# Patient Record
Sex: MALE | Employment: UNEMPLOYED | URBAN - METROPOLITAN AREA
[De-identification: names, ages, dates, MRNs, and addresses within clinical notes are randomized per-mention and may not be internally consistent; named-entity substitution may affect disease eponyms.]

---

## 2017-01-12 ENCOUNTER — DOCUMENTATION ONLY (OUTPATIENT)
Dept: NEUROLOGY | Age: 22
End: 2017-01-12

## 2017-01-13 ENCOUNTER — DOCUMENTATION ONLY (OUTPATIENT)
Dept: NEUROLOGY | Age: 22
End: 2017-01-13

## 2017-01-13 NOTE — PROGRESS NOTES
[facility] Drug Utilization Review Form commented on by Dr. Halie Morrow and faxed back.  (See Scan)

## 2017-02-06 ENCOUNTER — DOCUMENTATION ONLY (OUTPATIENT)
Dept: NEUROLOGY | Age: 22
End: 2017-02-06

## 2017-02-06 NOTE — PROGRESS NOTES
[facility] AT [facility] notes received and placed on Dr. Amber peng for review.  (See Notes)

## 2017-07-03 ENCOUNTER — HOSPITAL ENCOUNTER (INPATIENT)
Age: 22
LOS: 4 days | Discharge: HOME | DRG: 053 | End: 2017-07-07
Attending: EMERGENCY MEDICINE | Admitting: HOSPITALIST
Payer: MEDICAID

## 2017-07-03 ENCOUNTER — HOSPITAL ENCOUNTER (EMERGENCY)
Age: 22
Discharge: HOME | End: 2017-07-03
Attending: EMERGENCY MEDICINE | Admitting: EMERGENCY MEDICINE
Payer: MEDICAID

## 2017-07-03 ENCOUNTER — APPOINTMENT (OUTPATIENT)
Dept: CT IMAGING | Age: 22
DRG: 053 | End: 2017-07-03
Attending: PSYCHIATRY & NEUROLOGY
Payer: MEDICAID

## 2017-07-03 VITALS
WEIGHT: 174.6 LBS | SYSTOLIC BLOOD PRESSURE: 115 MMHG | OXYGEN SATURATION: 99 % | BODY MASS INDEX: 24.35 KG/M2 | HEART RATE: 90 BPM | RESPIRATION RATE: 19 BRPM | DIASTOLIC BLOOD PRESSURE: 68 MMHG | TEMPERATURE: 98 F

## 2017-07-03 DIAGNOSIS — G40.909 SEIZURE DISORDER (HCC): Primary | ICD-10-CM

## 2017-07-03 DIAGNOSIS — R56.9 SEIZURES (HCC): Primary | ICD-10-CM

## 2017-07-03 LAB
ANION GAP BLD CALC-SCNC: 12 MMOL/L (ref 3–18)
ANION GAP BLD CALC-SCNC: 9 MMOL/L (ref 3–18)
BASOPHILS # BLD AUTO: 0 K/UL (ref 0–0.06)
BASOPHILS # BLD AUTO: 0 K/UL (ref 0–0.06)
BASOPHILS # BLD: 0 % (ref 0–2)
BASOPHILS # BLD: 0 % (ref 0–2)
BUN SERPL-MCNC: 14 MG/DL (ref 7–18)
BUN SERPL-MCNC: 19 MG/DL (ref 7–18)
BUN/CREAT SERPL: 11 (ref 12–20)
BUN/CREAT SERPL: 13 (ref 12–20)
CALCIUM SERPL-MCNC: 8.5 MG/DL (ref 8.5–10.1)
CALCIUM SERPL-MCNC: 9 MG/DL (ref 8.5–10.1)
CARBAMAZEPINE SERPL-MCNC: 2 UG/ML (ref 4–12)
CHLORIDE SERPL-SCNC: 104 MMOL/L (ref 100–108)
CHLORIDE SERPL-SCNC: 108 MMOL/L (ref 100–108)
CO2 SERPL-SCNC: 23 MMOL/L (ref 21–32)
CO2 SERPL-SCNC: 23 MMOL/L (ref 21–32)
CREAT SERPL-MCNC: 1.08 MG/DL (ref 0.6–1.3)
CREAT SERPL-MCNC: 1.71 MG/DL (ref 0.6–1.3)
DIFFERENTIAL METHOD BLD: ABNORMAL
DIFFERENTIAL METHOD BLD: NORMAL
EOSINOPHIL # BLD: 0 K/UL (ref 0–0.4)
EOSINOPHIL # BLD: 0.2 K/UL (ref 0–0.4)
EOSINOPHIL NFR BLD: 0 % (ref 0–5)
EOSINOPHIL NFR BLD: 2 % (ref 0–5)
ERYTHROCYTE [DISTWIDTH] IN BLOOD BY AUTOMATED COUNT: 13.8 % (ref 11.6–14.5)
ERYTHROCYTE [DISTWIDTH] IN BLOOD BY AUTOMATED COUNT: 14.2 % (ref 11.6–14.5)
GLUCOSE SERPL-MCNC: 115 MG/DL (ref 74–99)
GLUCOSE SERPL-MCNC: 142 MG/DL (ref 74–99)
HCT VFR BLD AUTO: 38.4 % (ref 36–48)
HCT VFR BLD AUTO: 43.2 % (ref 36–48)
HGB BLD-MCNC: 13.1 G/DL (ref 13–16)
HGB BLD-MCNC: 14.7 G/DL (ref 13–16)
LYMPHOCYTES # BLD AUTO: 36 % (ref 21–52)
LYMPHOCYTES # BLD AUTO: 6 % (ref 21–52)
LYMPHOCYTES # BLD: 0.7 K/UL (ref 0.9–3.6)
LYMPHOCYTES # BLD: 3.3 K/UL (ref 0.9–3.6)
MAGNESIUM SERPL-MCNC: 2.8 MG/DL (ref 1.6–2.6)
MCH RBC QN AUTO: 28.2 PG (ref 24–34)
MCH RBC QN AUTO: 28.4 PG (ref 24–34)
MCHC RBC AUTO-ENTMCNC: 34 G/DL (ref 31–37)
MCHC RBC AUTO-ENTMCNC: 34.1 G/DL (ref 31–37)
MCV RBC AUTO: 82.8 FL (ref 74–97)
MCV RBC AUTO: 83.4 FL (ref 74–97)
MONOCYTES # BLD: 0.7 K/UL (ref 0.05–1.2)
MONOCYTES # BLD: 1.6 K/UL (ref 0.05–1.2)
MONOCYTES NFR BLD AUTO: 13 % (ref 3–10)
MONOCYTES NFR BLD AUTO: 8 % (ref 3–10)
NEUTS SEG # BLD: 10.3 K/UL (ref 1.8–8)
NEUTS SEG # BLD: 5 K/UL (ref 1.8–8)
NEUTS SEG NFR BLD AUTO: 54 % (ref 40–73)
NEUTS SEG NFR BLD AUTO: 81 % (ref 40–73)
PLATELET # BLD AUTO: 206 K/UL (ref 135–420)
PLATELET # BLD AUTO: 224 K/UL (ref 135–420)
PMV BLD AUTO: 10 FL (ref 9.2–11.8)
PMV BLD AUTO: 10.2 FL (ref 9.2–11.8)
POTASSIUM SERPL-SCNC: 3.5 MMOL/L (ref 3.5–5.5)
POTASSIUM SERPL-SCNC: 3.9 MMOL/L (ref 3.5–5.5)
RBC # BLD AUTO: 4.64 M/UL (ref 4.7–5.5)
RBC # BLD AUTO: 5.18 M/UL (ref 4.7–5.5)
SODIUM SERPL-SCNC: 139 MMOL/L (ref 136–145)
SODIUM SERPL-SCNC: 140 MMOL/L (ref 136–145)
WBC # BLD AUTO: 12.7 K/UL (ref 4.6–13.2)
WBC # BLD AUTO: 9.2 K/UL (ref 4.6–13.2)

## 2017-07-03 PROCEDURE — 96375 TX/PRO/DX INJ NEW DRUG ADDON: CPT

## 2017-07-03 PROCEDURE — 74011250637 HC RX REV CODE- 250/637: Performed by: PSYCHIATRY & NEUROLOGY

## 2017-07-03 PROCEDURE — 85025 COMPLETE CBC W/AUTO DIFF WBC: CPT | Performed by: EMERGENCY MEDICINE

## 2017-07-03 PROCEDURE — 99285 EMERGENCY DEPT VISIT HI MDM: CPT

## 2017-07-03 PROCEDURE — 74011250637 HC RX REV CODE- 250/637: Performed by: EMERGENCY MEDICINE

## 2017-07-03 PROCEDURE — 74011250636 HC RX REV CODE- 250/636: Performed by: NURSE PRACTITIONER

## 2017-07-03 PROCEDURE — 74011250637 HC RX REV CODE- 250/637: Performed by: HOSPITALIST

## 2017-07-03 PROCEDURE — 96361 HYDRATE IV INFUSION ADD-ON: CPT

## 2017-07-03 PROCEDURE — 80156 ASSAY CARBAMAZEPINE TOTAL: CPT | Performed by: EMERGENCY MEDICINE

## 2017-07-03 PROCEDURE — 80048 BASIC METABOLIC PNL TOTAL CA: CPT | Performed by: EMERGENCY MEDICINE

## 2017-07-03 PROCEDURE — 74011250636 HC RX REV CODE- 250/636

## 2017-07-03 PROCEDURE — 74011250636 HC RX REV CODE- 250/636: Performed by: HOSPITALIST

## 2017-07-03 PROCEDURE — 96374 THER/PROPH/DIAG INJ IV PUSH: CPT

## 2017-07-03 PROCEDURE — 70450 CT HEAD/BRAIN W/O DYE: CPT

## 2017-07-03 PROCEDURE — 65660000001 HC RM ICU INTERMED STEPDOWN

## 2017-07-03 PROCEDURE — 80048 BASIC METABOLIC PNL TOTAL CA: CPT | Performed by: NURSE PRACTITIONER

## 2017-07-03 PROCEDURE — 96365 THER/PROPH/DIAG IV INF INIT: CPT

## 2017-07-03 PROCEDURE — 80177 DRUG SCRN QUAN LEVETIRACETAM: CPT | Performed by: EMERGENCY MEDICINE

## 2017-07-03 PROCEDURE — 77030011256 HC DRSG MEPILEX <16IN NO BORD MOLN -A

## 2017-07-03 PROCEDURE — 83735 ASSAY OF MAGNESIUM: CPT | Performed by: EMERGENCY MEDICINE

## 2017-07-03 RX ORDER — ENOXAPARIN SODIUM 100 MG/ML
40 INJECTION SUBCUTANEOUS EVERY 24 HOURS
Status: DISCONTINUED | OUTPATIENT
Start: 2017-07-03 | End: 2017-07-07 | Stop reason: HOSPADM

## 2017-07-03 RX ORDER — CARBAMAZEPINE 100 MG/1
400 TABLET, CHEWABLE ORAL
Status: DISCONTINUED | OUTPATIENT
Start: 2017-07-03 | End: 2017-07-03 | Stop reason: CLARIF

## 2017-07-03 RX ORDER — CARBAMAZEPINE 200 MG/1
400 TABLET, EXTENDED RELEASE ORAL 2 TIMES DAILY
Status: DISCONTINUED | OUTPATIENT
Start: 2017-07-03 | End: 2017-07-07 | Stop reason: HOSPADM

## 2017-07-03 RX ORDER — CARBAMAZEPINE 200 MG/1
400 TABLET ORAL
Status: COMPLETED | OUTPATIENT
Start: 2017-07-03 | End: 2017-07-03

## 2017-07-03 RX ORDER — LAMOTRIGINE 100 MG/1
300 TABLET ORAL 2 TIMES DAILY
Status: DISCONTINUED | OUTPATIENT
Start: 2017-07-03 | End: 2017-07-07 | Stop reason: HOSPADM

## 2017-07-03 RX ORDER — LORAZEPAM 2 MG/ML
1 INJECTION INTRAMUSCULAR
Status: COMPLETED | OUTPATIENT
Start: 2017-07-03 | End: 2017-07-03

## 2017-07-03 RX ORDER — LEVETIRACETAM 10 MG/ML
1000 INJECTION INTRAVASCULAR EVERY 12 HOURS
Status: DISCONTINUED | OUTPATIENT
Start: 2017-07-03 | End: 2017-07-04

## 2017-07-03 RX ORDER — CARBAMAZEPINE 200 MG/1
400 TABLET, EXTENDED RELEASE ORAL EVERY 12 HOURS
Status: DISCONTINUED | OUTPATIENT
Start: 2017-07-03 | End: 2017-07-03 | Stop reason: SDUPTHER

## 2017-07-03 RX ORDER — SODIUM CHLORIDE 9 MG/ML
150 INJECTION, SOLUTION INTRAVENOUS CONTINUOUS
Status: DISCONTINUED | OUTPATIENT
Start: 2017-07-03 | End: 2017-07-07 | Stop reason: HOSPADM

## 2017-07-03 RX ORDER — LEVETIRACETAM 500 MG/1
500 TABLET ORAL 2 TIMES DAILY
Status: DISCONTINUED | OUTPATIENT
Start: 2017-07-03 | End: 2017-07-03 | Stop reason: CLARIF

## 2017-07-03 RX ORDER — LEVETIRACETAM 500 MG/5ML
INJECTION, SOLUTION, CONCENTRATE INTRAVENOUS
Status: COMPLETED
Start: 2017-07-03 | End: 2017-07-03

## 2017-07-03 RX ORDER — LAMOTRIGINE 100 MG/1
100 TABLET ORAL 2 TIMES DAILY
Status: DISCONTINUED | OUTPATIENT
Start: 2017-07-03 | End: 2017-07-03 | Stop reason: SDUPTHER

## 2017-07-03 RX ORDER — LORAZEPAM 2 MG/ML
INJECTION INTRAMUSCULAR
Status: COMPLETED
Start: 2017-07-03 | End: 2017-07-03

## 2017-07-03 RX ORDER — LORAZEPAM 2 MG/ML
2 INJECTION INTRAMUSCULAR
Status: COMPLETED | OUTPATIENT
Start: 2017-07-03 | End: 2017-07-03

## 2017-07-03 RX ADMIN — CARBAMAZEPINE 400 MG: 200 TABLET ORAL at 07:43

## 2017-07-03 RX ADMIN — LORAZEPAM 1 MG: 2 INJECTION INTRAMUSCULAR at 05:16

## 2017-07-03 RX ADMIN — LEVETIRACETAM 1 MG: 100 INJECTION, SOLUTION INTRAVENOUS at 17:57

## 2017-07-03 RX ADMIN — LORAZEPAM 1 MG: 2 INJECTION INTRAMUSCULAR at 17:03

## 2017-07-03 RX ADMIN — SODIUM CHLORIDE 125 ML/HR: 900 INJECTION, SOLUTION INTRAVENOUS at 14:34

## 2017-07-03 RX ADMIN — SODIUM CHLORIDE 125 ML/HR: 900 INJECTION, SOLUTION INTRAVENOUS at 19:49

## 2017-07-03 RX ADMIN — ENOXAPARIN SODIUM 40 MG: 40 INJECTION SUBCUTANEOUS at 21:18

## 2017-07-03 RX ADMIN — CARBAMAZEPINE 400 MG: 200 TABLET, EXTENDED RELEASE ORAL at 21:17

## 2017-07-03 RX ADMIN — LAMOTRIGINE 300 MG: 100 TABLET ORAL at 19:31

## 2017-07-03 RX ADMIN — LEVETIRACETAM 1000 MG: 10 INJECTION INTRAVENOUS at 17:48

## 2017-07-03 NOTE — CONSULTS
Neurology Consult Note      Patient: Lamont Rios MRN: [id number]  SSN: [id number]    YOB: 1995  Age: 24 y.o. Sex: male      Subjective:      Lamont Rios is a 24 y.o. male who is being seen for seizures. History provided by mother. History of seizure disorder since age 15. Initially had some generalized convulsions then after been treated only staring spells with slight head turn to side. Was seen by Dr. Enma Paredes neurology last year then switched to Dr. Khuhsboo Darling. Mother showed me his current medication bottles : Lamictal 300 mg bid, keppra 500 mg bid, Tegretol  mg bid. Mother said was away for 10 days so was not sure about his complaint in her absence. His carbamazepine level was 2 today. Apparently today had a total of 3 generalized seizures. Received tegretol 400 mg in am and then ativan after his last seizure. Been drowsy since then. 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 No pertinent surgical history.    Family History   Problem Relation Age of Onset    No Known Problems Mother     No Known Problems Sister     No Known Problems Brother     Cancer Maternal Uncle     Seizures Maternal Uncle     Seizures Paternal Uncle     Cancer Maternal Grandmother     Diabetes Maternal Grandfather     High Cholesterol Maternal Grandfather     Seizures Other      Paternal Cousin, male    No Known Problems Sister     Cancer Other      Social History   Substance Use Topics    Smoking status: Never Smoker    Smokeless tobacco: Never Used    Alcohol use No      Current Facility-Administered Medications   Medication Dose Route Frequency Provider Last Rate Last Dose    0.9% sodium chloride infusion  125 mL/hr IntraVENous CONTINUOUS Bettie Chang  mL/hr at 07/03/17 1434 125 mL/hr at 07/03/17 1434    levETIRAcetam (KEPPRA) 1,000 mg in 100 ml IVPB  1,000 mg IntraVENous Q12H Jared Melton  mL/hr at 07/03/17 1748 1,000 mg at 07/03/17 1748     Current Outpatient Prescriptions   Medication Sig Dispense Refill    carBAMazepine XR (TEGRETOL XR) 400 mg SR tablet Take 1 Tab by mouth two (2) times a day. 60 Tab 3    levETIRAcetam (KEPPRA) 500 mg tablet Take 1 Tab by mouth two (2) times a day. 60 Tab 3    lamoTRIgine (LAMICTAL) 100 mg tablet Take 1 Tab by mouth two (2) times a day. 60 Tab 3    lamoTRIgine (LAMICTAL) 50 mg disintegrating tablet Take 1 Tab by mouth two (2) times a day. 60 Tab 1        No Known Allergies    Labs:    Recent Results (from the past 24 hour(s))   METABOLIC PANEL, BASIC    Collection Time: 07/03/17  4:50 AM   Result Value Ref Range    Sodium 139 136 - 145 mmol/L    Potassium 3.5 3.5 - 5.5 mmol/L    Chloride 104 100 - 108 mmol/L    CO2 23 21 - 32 mmol/L    Anion gap 12 3.0 - 18 mmol/L    Glucose 115 (H) 74 - 99 mg/dL    BUN 14 7.0 - 18 MG/DL    Creatinine 1.08 0.6 - 1.3 MG/DL    BUN/Creatinine ratio 13 12 - 20      GFR est AA >60 >60 ml/min/1.73m2    GFR est non-AA >60 >60 ml/min/1.73m2    Calcium 9.0 8.5 - 10.1 MG/DL   CBC WITH AUTOMATED DIFF    Collection Time: 07/03/17  4:50 AM   Result Value Ref Range    WBC 9.2 4.6 - 13.2 K/uL    RBC 5.18 4.70 - 5.50 M/uL    HGB 14.7 13.0 - 16.0 g/dL    HCT 43.2 36.0 - 48.0 %    MCV 83.4 74.0 - 97.0 FL    MCH 28.4 24.0 - 34.0 PG    MCHC 34.0 31.0 - 37.0 g/dL    RDW 13.8 11.6 - 14.5 %    PLATELET 380 141 - 749 K/uL    MPV 10.2 9.2 - 11.8 FL    NEUTROPHILS 54 40 - 73 %    LYMPHOCYTES 36 21 - 52 %    MONOCYTES 8 3 - 10 %    EOSINOPHILS 2 0 - 5 %    BASOPHILS 0 0 - 2 %    ABS. NEUTROPHILS 5.0 1.8 - 8.0 K/UL    ABS. LYMPHOCYTES 3.3 0.9 - 3.6 K/UL    ABS. MONOCYTES 0.7 0.05 - 1.2 K/UL    ABS. EOSINOPHILS 0.2 0.0 - 0.4 K/UL    ABS.  BASOPHILS 0.0 0.0 - 0.06 K/UL    DF AUTOMATED     CARBAMAZEPINE    Collection Time: 07/03/17  4:50 AM   Result Value Ref Range    Carbamazepine 2.0 (L) 4.0 - 12.0 ug/mL   MAGNESIUM    Collection Time: 07/03/17  4:50 AM   Result Value Ref Range    Magnesium 2.8 (H) 1.6 - 2.6 mg/dL   CBC WITH AUTOMATED DIFF    Collection Time: 07/03/17  2:18 PM   Result Value Ref Range    WBC 12.7 4.6 - 13.2 K/uL    RBC 4.64 (L) 4.70 - 5.50 M/uL    HGB 13.1 13.0 - 16.0 g/dL    HCT 38.4 36.0 - 48.0 %    MCV 82.8 74.0 - 97.0 FL    MCH 28.2 24.0 - 34.0 PG    MCHC 34.1 31.0 - 37.0 g/dL    RDW 14.2 11.6 - 14.5 %    PLATELET 816 196 - 908 K/uL    MPV 10.0 9.2 - 11.8 FL    NEUTROPHILS 81 (H) 40 - 73 %    LYMPHOCYTES 6 (L) 21 - 52 %    MONOCYTES 13 (H) 3 - 10 %    EOSINOPHILS 0 0 - 5 %    BASOPHILS 0 0 - 2 %    ABS. NEUTROPHILS 10.3 (H) 1.8 - 8.0 K/UL    ABS. LYMPHOCYTES 0.7 (L) 0.9 - 3.6 K/UL    ABS. MONOCYTES 1.6 (H) 0.05 - 1.2 K/UL    ABS. EOSINOPHILS 0.0 0.0 - 0.4 K/UL    ABS. BASOPHILS 0.0 0.0 - 0.06 K/UL    DF AUTOMATED     METABOLIC PANEL, BASIC    Collection Time: 07/03/17  2:18 PM   Result Value Ref Range    Sodium 140 136 - 145 mmol/L    Potassium 3.9 3.5 - 5.5 mmol/L    Chloride 108 100 - 108 mmol/L    CO2 23 21 - 32 mmol/L    Anion gap 9 3.0 - 18 mmol/L    Glucose 142 (H) 74 - 99 mg/dL    BUN 19 (H) 7.0 - 18 MG/DL    Creatinine 1.71 (H) 0.6 - 1.3 MG/DL    BUN/Creatinine ratio 11 (L) 12 - 20      GFR est AA >60 >60 ml/min/1.73m2    GFR est non-AA 51 (L) >60 ml/min/1.73m2    Calcium 8.5 8.5 - 10.1 MG/DL       Review of Systems: too drowsy to provide. Objective:     Vitals:    07/03/17 1700 07/03/17 1710 07/03/17 1720 07/03/17 1730   BP: 127/71 123/72 127/72 131/73   Pulse: 90 87 93 82   Resp: 12 19     Temp:       SpO2: 97% 100% 100% 100%   Weight:            Physical Exam:  {Neurological examination: Neck supple. Mental status examination: Very dowsy but I was able to arouse. Was oriented to mother, Hospital, moth and year. Was difficult to keep alert. Cranial nerves: Pupils 3 mm reactive. Face symmetric. Motor exam: Strength was above gravity in all extremities. Sensory: Too drowsy to assess. Deep tendon reflexes: 2+ in bilateral biceps, triceps, brachioradialis, patellar, ankle reflexes. No Babinski responses. Gait: Deferred. Assessment/Plan:     Hospital Problems  Date Reviewed: 7/3/2017          Codes Class Noted POA    * (Principal)Seizures ([facility] ICD-10-CM: R56.9  ICD-9-CM: 780.39  7/3/2017 Yes        Seizure disorder ([facility] ICD-10-CM: G40.909  ICD-9-CM: 345.90  7/3/2017 Yes            History of epilepsy. ? Primary generalized epilepsy. 3 Tonic clonic seizures today ( wake between) with possible recent non-compliance with his medications. No fever, high while blood count or neck rigidity to suspect meningitis. Appeared drowsy now but responded appropriately. To load with Keppra 1000 mg IV now and continue 1000 mg bid. Also to resume his routine Tegretol , lamictal when more alert to swallow. .    Step down monitoring. Seizure precautions. CT head since has not had generalized seizures from a while to make sure no new major findings    I discussed with his mother that in his case in an outpatient setting I would have worked on tappering off his tegretol and only leave him on keppra and lamical for long term therapy and she said his private neurologist was planning to do so. So I will defer such long term plan to him. Discussed plan with Dr. Linda Richter.       Signed By: Edwin Segovia MD     July 3, 2017

## 2017-07-03 NOTE — Clinical Note
Status[de-identified] Inpatient [101] Type of Bed: Medical [8] Inpatient Hospitalization Certified Necessary for the Following Reasons: 3. Patient receiving treatment that can only be provided in an inpatient setting (further clarification in H&P documentation) Admitting Diagnosis: Seizures ([facility] [[id number]] Admitting Physician: Donna Varela Attending Physician: Donna Varela Estimated Length of Stay: 2 Midnights Discharge Plan[de-identified] Home with Office Follow-up

## 2017-07-03 NOTE — ED NOTES
Dr. Karime Elizabeth @pt bs to update pts mother on plan of care. Pt resting in position of comfort, awakens to verbal stimuli.

## 2017-07-03 NOTE — ED NOTES
Bedside and Verbal shift change report given to Dannielle Matos RN (oncoming nurse) by Grover Giron RN (offgoing nurse). Report included the following information SBAR and ED Summary.

## 2017-07-03 NOTE — H&P
History and Physical    Patient: Albert Bryant               Sex: male          DOA: 7/3/2017       YOB: 1995      Age:  24 y.o.        LOS:  LOS: 0 days        HPI:     Albert Bryant is a 24 y.o. male who presented to the ER with seizures. His first seizure was this morning around 03:45. He presented to the ER and was ultimately sent home. He came back to the ER a second time with seizures today. He had seizure again this afternoon in the ER totaling three episodes for the day. His mother reports that she has been out of town and she suspects that he has not been taking his meds like he should be. He will be admitted for ongoing management. Past Medical History:   Diagnosis Date    Convulsions, epileptic ([address]     Hallucination, visual     Learning difficulty     Refractory epilepsy ([facility] [address] 2003       Social History:   Tobacco use:  Patient does not smoke   Alcohol use:  Patient does not use alcohol   Occupation:  Patient is on disability from his seizures    Family History:   Both patient's parents are healthy   Two cousins on his father's side have seizures    Review of Systems    Constitutional:  No fever or weight loss  HEENT:  No headache or visual changes  Cardiovascular:  No chest pain or diaphoresis  Respiratory:  No coughing, wheezing, or shortness of breath. GI:  No nausea or vomitting. No diarrhea  :  No hematuria or dysuria  Skin:  No rashes or moles  Neuro:  Seizures as above, no syncope  Hematological:  No bruising or bleeding  Endocrine:  No diabetes or thyroid disease    Physical Exam:      Visit Vitals    /73    Pulse 85    Temp 99 °F (37.2 °C)    Resp 20    Wt 80.7 kg (178 lb)    SpO2 100%    BMI 24.83 kg/m2       Physical Exam:    Gen:  No distress, alert  HEENT:  Normal cephalic atraumatic, extra-occular movements are intact.   Neck:  Supple, No JVD  Lungs:  Clear bilaterally, no wheeze, no rales, normal effort  Heart:  Regular Rate and Rhythm, normal S1 and S2, no edema  Abdomen:  Soft, non tender, normal bowel sounds, no guarding. Extremities:  Well perfused, no cyanosis or edema  Neurological:  Awake and alert, CN's are intact, normal strength throughout extremities  Skin:  No rashes or moles  Mental Status:  Normal thought process, does not appear anxious    Laboratory Studies:    BMP:   Lab Results   Component Value Date/Time     07/03/2017 02:18 PM    K 3.9 07/03/2017 02:18 PM     07/03/2017 02:18 PM    CO2 23 07/03/2017 02:18 PM    AGAP 9 07/03/2017 02:18 PM     (H) 07/03/2017 02:18 PM    BUN 19 (H) 07/03/2017 02:18 PM    CREA 1.71 (H) 07/03/2017 02:18 PM    GFRAA >60 07/03/2017 02:18 PM    GFRNA 51 (L) 07/03/2017 02:18 PM     CBC:   Lab Results   Component Value Date/Time    WBC 12.7 07/03/2017 02:18 PM    HGB 13.1 07/03/2017 02:18 PM    HCT 38.4 07/03/2017 02:18 PM     07/03/2017 02:18 PM       Assessment/Plan     Principal Problem:    Seizures ([facility] [address] (7/3/2017)    Active Problems:    Seizure disorder ([facility] [address] (7/3/2017)        PLAN:    Discussed with Neurology. We will give Keppra now to decrease chance of ongoing seizure. Discussed with mother, we will encourage medication compliance. Follow neuro progress  Seizure precautions.

## 2017-07-03 NOTE — PROGRESS NOTES
Chart reviewed by . The Rev.  [address] [facility] [address] [place] [address] [facility]  [facility] [phone] / [facility] [phone]

## 2017-07-03 NOTE — ED NOTES
Tegretol level low - will give 400 mg dose now (needs to fill prescription) - precautions given to parent

## 2017-07-03 NOTE — ED TRIAGE NOTES
Received 5 mg intranasal midazolam per EMS. Had a witnessed grand mal seizure lasting about 8 minutes at about 13:10.

## 2017-07-03 NOTE — ED NOTES
Pt medicated with 1mg ativan for tonic clonic sz activity lasting approx 1.5minutes, family @bs. Family reports sz in ED worse than at home.

## 2017-07-03 NOTE — IP AVS SNAPSHOT
Luca Bang 
 
 
 [address] 
[phone] Patient: Joseph Gilmore MRN: [id number] LYT:0/83/4690 You are allergic to the following No active allergies Recent Documentation Weight BMI Smoking Status 78.9 kg 24.27 kg/m2 Never Smoker Emergency Contacts Name Discharge Info Relation Home Work Mobile Mary Anne Bella DISCHARGE CAREGIVER [3] Mother [14]   [phone] Mary Anne Bella  Parent [1] [phone] About your hospitalization You were admitted on:  July 3, 2017 You last received care in the:  [facility] 3 INTRVNT[facility] CARDIO You were discharged on:  July 7, 2017 Unit phone number:  [phone] Why you were hospitalized Your primary diagnosis was:  Seizures (Hcc) Your diagnoses also included:  Seizure Disorder (Hcc) Providers Seen During Your Hospitalizations Provider Role Specialty Primary office phone Marcelino Tejeda MD Attending Provider Emergency Medicine [phone] Dorinda Mayers MD Attending Provider Internal Medicine [phone] Archana Atwood MD Attending Provider [facility] [phone] Your Primary Care Physician (PCP) Primary Care Physician Office Phone Office Fax Deb Arshad [phone] Follow-up Information Follow up With Details Comments Contact Info Tawana Maguire MD In 1 week  [address] [facility] [phone] Current Discharge Medication List  
  
CONTINUE these medications which have CHANGED Dose & Instructions Dispensing Information Comments Morning Noon Evening Bedtime  
 lamoTRIgine 150 mg tablet Commonly known as: LaMICtal  
What changed:   
- medication strength 
- how much to take - Another medication with the same name was removed.  Continue taking this medication, and follow the directions you see here. Your next dose is:  tonight Dose:  300 mg Take 2 Tabs by mouth two (2) times a day. Quantity:  120 Tab Refills:  0 CONTINUE these medications which have NOT CHANGED Dose & Instructions Dispensing Information Comments Morning Noon Evening Bedtime  
 carBAMazepine  mg SR tablet Commonly known as:  TEGretol XR Dose:  400 mg Take 1 Tab by mouth two (2) times a day. Quantity:  60 Tab Refills:  3  
     
   
   
   
  
 levETIRAcetam 500 mg tablet Commonly known as:  KEPPRA Dose:  500 mg Take 1 Tab by mouth two (2) times a day. Quantity:  60 Tab Refills:  3 Where to Get Your Medications These medications were sent to [address] [place] [address] [address] [address] Phone:  [phone]  
  lamoTRIgine 150 mg tablet Discharge Instructions Seizure: Care Instructions Your Care Instructions Seizures are caused by abnormal patterns of electrical signals in the brain. They are different for each person. Seizures can affect movement, speech, vision, or awareness. Some people have only slight shaking of a hand and do not pass out. Other people may pass out and have violent shaking of the whole body. Some people appear to stare into space. They are awake, but they can't respond normally. Later, they may not remember what happened. You may need tests to identify the type and cause of the seizures. A seizure may occur only once, or you may have them more than one time. Taking medicines as directed and following up with your doctor may help keep you from having more seizures. The doctor has checked you carefully, but problems can develop later. If you notice any problems or new symptoms, get medical treatment right away. Follow-up care is a key part of your treatment and safety. Be sure to make and go to all appointments, and call your doctor if you are having problems. It's also a good idea to know your test results and keep a list of the medicines you take. How can you care for yourself at home? · Be safe with medicines. Take your medicines exactly as prescribed. Call your doctor if you think you are having a problem with your medicine. · Do not do any activity that could be dangerous to you or others until your doctor says it is safe to do so. For example, do not drive a car, operate machinery, swim, or climb ladders. · Be sure that anyone treating you for any health problem knows that you have had a seizure and what medicines you are taking for it. · Identify and avoid things that may make you more likely to have a seizure. These may include lack of sleep, alcohol or drug use, stress, or not eating. · Make sure you go to your follow-up appointment. When should you call for help? Call 911 anytime you think you may need emergency care. For example, call if: 
· You have another seizure. · You have more than one seizure in 24 hours. · You have new symptoms, such as trouble walking, speaking, or thinking clearly. Call your doctor now or seek immediate medical care if: 
· You are not acting normally. Watch closely for changes in your health, and be sure to contact your doctor if you have any problems. Where can you learn more? Go to http://isac-tonny.info/. Enter O128 in the search box to learn more about \"Seizure: Care Instructions. \" Current as of: October 14, 2016 Content Version: 11.3 © 9445-7533 writewith. Care instructions adapted under license by V[employer]obe (which disclaims liability or warranty for this information).  If you have questions about a medical condition or this instruction, always ask your healthcare professional. [employer] disclaims any warranty or liability for your use of this information. Epilepsy: Care Instructions Your Care Instructions Epilepsy is a common condition that causes repeated seizures. The seizures are caused by bursts of electrical activity in the brain that aren't normal. Seizures may cause problems with muscle control, movement, speech, vision, or awareness. They can be scary. Epilepsy affects each person differently. Some people have only a few seizures. Others get them more often. If you know what triggers a seizure, you may be able to avoid having one. You can take medicines to control and reduce seizures. You and your doctor will need to find the right combination, schedule, and dose of medicine. This may take time and careful changes. Seizures may get worse and happen more often over time. Follow-up care is a key part of your treatment and safety. Be sure to make and go to all appointments, and call your doctor if you are having problems. It's also a good idea to know your test results and keep a list of the medicines you take. How can you care for yourself at home? · Be safe with medicines. Take your medicines exactly as prescribed. Call your doctor if you think you are having a problem with your medicine. · Make a treatment plan with your doctor. Be sure to follow your plan. · Try to identify and avoid things that may make you more likely to have a seizure. These may include: ¨ Not getting enough sleep. ¨ Using drugs or alcohol. ¨ Being emotionally stressed. ¨ Skipping meals. · Keep a record of any seizures you have. Note the date, time of day, and any details about the seizure that you can remember. Your doctor can use this information to plan or adjust your medicine or other treatment. · Be sure that any doctor treating you for another condition knows that you have epilepsy. Each doctor should know what medicines you are taking, if any. · Wear a medical ID bracelet. You can buy this at most [employer]. If you have a seizure that leaves you unconscious or unable to speak for yourself, this bracelet will let those who are treating you know that you have epilepsy. · Talk to your doctor about whether it is safe for you to do certain activities, such as drive or swim. When should you call for help? Call 911 anytime you think you may need emergency care. For example, call if: · A seizure does not stop as it normally does. · You have new symptoms such as: 
¨ Numbness, tingling, or weakness on one side of your body or face. ¨ Vision changes. ¨ Trouble speaking or thinking clearly. Call your doctor now or seek immediate medical care if: 
· You have a fever. · You have a severe headache. Watch closely for changes in your health, and be sure to contact your doctor if: · The normal pattern or features of your seizures change. Where can you learn more? Go to http://isac-tonny.info/. Jhoan Dowell in the search box to learn more about \"Epilepsy: Care Instructions. \" Current as of: October 14, 2016 Content Version: 11.3 © 4455-8418 Ameibo. Care instructions adapted under license by Newzstand (which disclaims liability or warranty for this information). If you have questions about a medical condition or this instruction, always ask your healthcare professional. [place] [address] any warranty or liability for your use of this information. DISCHARGE SUMMARY from Nurse The following personal items are in your possession at time of discharge: 
 
Dental Appliances: None Visual Aid: Glasses, With patient Home Medications: None Jewelry: Bracelet (4 rubber bracelets with patient) Clothing: Sent home Other Valuables: None PATIENT INSTRUCTIONS: 
 
 
F-face looks uneven A-arms unable to move or move unevenly S-speech slurred or non-existent T-time-call 911 as soon as signs and symptoms begin-DO NOT go Back to bed or wait to see if you get better-TIME IS BRAIN. Warning Signs of HEART ATTACK Call 911 if you have these symptoms: 
? Chest discomfort. Most heart attacks involve discomfort in the center of the chest that lasts more than a few minutes, or that goes away and comes back. It can feel like uncomfortable pressure, squeezing, fullness, or pain. ? Discomfort in other areas of the upper body. Symptoms can include pain or discomfort in one or both arms, the back, neck, jaw, or stomach. ? Shortness of breath with or without chest discomfort. ? Other signs may include breaking out in a cold sweat, nausea, or lightheadedness. Don't wait more than five minutes to call [address]! Fast action can save your life. Calling 911 is almost always the fastest way to get lifesaving treatment. Emergency Medical Services staff can begin treatment when they arrive  up to an hour sooner than if someone gets to the hospital by car. The discharge information has been reviewed with the patient. The patient verbalized understanding. Discharge medications reviewed with the patient and appropriate educational materials and side effects teaching were provided. AA Party Activation Thank you for requesting access to AA Party. Please follow the instructions below to securely access and download your online medical record. AA Party allows you to send messages to your doctor, view your test results, renew your prescriptions, schedule appointments, and more. How Do I Sign Up? 1. In your internet browser, go to [email or web address] 
 2. Click on the First Time User? Click Here link in the Sign In box. You will be redirect to the New Member Sign Up page. 3. Enter your Core[employer] Access Code exactly as it appears below. You will not need to use this code after youve completed the sign-up process. If you do not sign up before the expiration date, you must request a new code. MyChart Access Code: Activation code not generated Current Corensic Status: Active (This is the date your MyChart access code will ) 4. Enter the last four digits of your Social Security Number (xxxx) and Date of Birth (mm/dd/yyyy) as indicated and click Submit. You will be taken to the next sign-up page. 5. Create a Insightlyt ID. This will be your Corensic login ID and cannot be changed, so think of one that is secure and easy to remember. 6. Create a Corensic password. You can change your password at any time. 7. Enter your Password Reset Question and Answer. This can be used at a later time if you forget your password. 8. Enter your e-mail address. You will receive e-mail notification when new information is available in [address] 9. Click Sign Up. You can now view and download portions of your medical record. 10. Click the Download Summary menu link to download a portable copy of your medical information. Additional Information If you have questions, please visit the Frequently Asked Questions section of the Corensic website at [email or web address] Remember, Corensic is NOT to be used for urgent needs. For medical emergencies, dial 911. Patient armband removed and shredded Discharge Orders None Introducing [facility]! Dear Inderjit Manzo: Thank you for requesting a Corensic account. Our records indicate that you have previously registered for a Corensic account but its currently inactive. Please call our Corensic support line at [phone]. Additional Information If you have questions, please visit the Frequently Asked Questions section of the "[employer]hart website at [email or web address] Remember, MyChart is NOT to be used for urgent needs. For medical emergencies, dial 911. Now available from your iPhone and Android! General Information Please provide this summary of care documentation to your next provider. Patient Signature:  ____________________________________________________________ Date:  ____________________________________________________________  
  
Nicholas Glasscock Provider Signature:  ____________________________________________________________ Date:  ____________________________________________________________

## 2017-07-03 NOTE — DISCHARGE INSTRUCTIONS
Epilepsy: Care Instructions  Your Care Instructions  Epilepsy is a common condition that causes repeated seizures. The seizures are caused by bursts of electrical activity in the brain that aren't normal. Seizures may cause problems with muscle control, movement, speech, vision, or awareness. They can be scary. Epilepsy affects each person differently. Some people have only a few seizures. Others get them more often. If you know what triggers a seizure, you may be able to avoid having one. You can take medicines to control and reduce seizures. You and your doctor will need to find the right combination, schedule, and dose of medicine. This may take time and careful changes. Seizures may get worse and happen more often over time. Follow-up care is a key part of your treatment and safety. Be sure to make and go to all appointments, and call your doctor if you are having problems. It's also a good idea to know your test results and keep a list of the medicines you take. How can you care for yourself at home? · Be safe with medicines. Take your medicines exactly as prescribed. Call your doctor if you think you are having a problem with your medicine. · Make a treatment plan with your doctor. Be sure to follow your plan. · Try to identify and avoid things that may make you more likely to have a seizure. These may include:  ¨ Not getting enough sleep. ¨ Using drugs or alcohol. ¨ Being emotionally stressed. ¨ Skipping meals. · Keep a record of any seizures you have. Note the date, time of day, and any details about the seizure that you can remember. Your doctor can use this information to plan or adjust your medicine or other treatment. · Be sure that any doctor treating you for another condition knows that you have epilepsy. Each doctor should know what medicines you are taking, if any. · Wear a medical ID bracelet. You can buy this at most [employer].  If you have a seizure that leaves you unconscious or unable to speak for yourself, this bracelet will let those who are treating you know that you have epilepsy. · Talk to your doctor about whether it is safe for you to do certain activities, such as drive or swim. When should you call for help? Call 911 anytime you think you may need emergency care. For example, call if:  · A seizure does not stop as it normally does. · You have new symptoms such as:  ¨ Numbness, tingling, or weakness on one side of your body or face. ¨ Vision changes. ¨ Trouble speaking or thinking clearly. Call your doctor now or seek immediate medical care if:  · You have a fever. · You have a severe headache. Watch closely for changes in your health, and be sure to contact your doctor if:  · The normal pattern or features of your seizures change. Where can you learn more? Go to http://isacProspect Acceleratortonny.info/. Marie Rae in the search box to learn more about \"Epilepsy: Care Instructions. \"  Current as of: October 14, 2016  Content Version: 11.3  © 5907-0416 Cytheris. Care instructions adapted under license by Protein Forest (which disclaims liability or warranty for this information). If you have questions about a medical condition or this instruction, always ask your healthcare professional. [address] any warranty or liability for your use of this information. [employer] Activation    Thank you for requesting access to [employer]. Please follow the instructions below to securely access and download your online medical record. [employer] International allows you to send messages to your doctor, view your test results, renew your prescriptions, schedule appointments, and more. How Do I Sign Up? 1. In your internet browser, go to [email or web address]  2. Click on the First Time User? Click Here link in the Sign In box. You will be redirect to the New Member Sign Up page. 3. Enter your [employer] Access Code exactly as it appears below. You will not need to use this code after youve completed the sign-up process. If you do not sign up before the expiration date, you must request a new code. Pulse Therapeutics Access Code: Activation code not generated  Current [employer] Status: Active (This is the date your P[employer] access code will )    4. Enter the last four digits of your Social Security Number (xxxx) and Date of Birth (mm/dd/yyyy) as indicated and click Submit. You will be taken to the next sign-up page. 5. Create a One Touch EMRt ID. This will be your Pulse Therapeutics login ID and cannot be changed, so think of one that is secure and easy to remember. 6. Create a P[employer] Therapeutics password. You can change your password at any time. 7. Enter your Password Reset Question and Answer. This can be used at a later time if you forget your password. 8. Enter your e-mail address. You will receive e-mail notification when new information is available in [address]. 9. Click Sign Up. You can now view and download portions of your medical record. 10. Click the Download Summary menu link to download a portable copy of your medical information. Additional Information    If you have questions, please visit the Frequently Asked Questions section of the Pulse Therapeutics website at [email or web address] Remember, P[employer]rapeutics is NOT to be used for urgent needs. For medical emergencies, dial 911.

## 2017-07-03 NOTE — ED PROVIDER NOTES
HPI Comments: Maryuri Kowalski is a 24year old male who presents to the ED after being brought in by [facility]. Pt had a protracted seizure at 8 minutes. He was given 5 mg of Versed to stop the seizure. Of note, the Pt was here earlier in the day for the same complaint. He is complaint with his seizure medication. Patient is a 24 y.o. male presenting with seizures. The history is provided by the patient, the EMS personnel and a parent. The history is limited by the condition of the patient (Mother and EMR provided Hx. ). No  was used. Seizure    This is a recurrent problem. The current episode started less than 1 hour ago. The problem has been resolved (Pt arrived post ictal). There was 1 (Protracted at 8 minutes) seizure. 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 No pertinent surgical history. Family History:   Problem Relation Age of Onset    No Known Problems Mother     No Known Problems Sister     No Known Problems Brother     Cancer Maternal Uncle     Seizures Maternal Uncle     Seizures Paternal Uncle     Cancer Maternal Grandmother     Diabetes Maternal Grandfather     High Cholesterol Maternal Grandfather     Seizures Other      Paternal Cousin, male    No Known Problems Sister     Cancer Other        Social History     Social History    Marital status: SINGLE     Spouse name: N/A    Number of children: N/A    Years of education: N/A     Occupational History    Not on file. Social History Main Topics    Smoking status: Never Smoker    Smokeless tobacco: Never Used    Alcohol use No    Drug use: No    Sexual activity: Not Currently     Other Topics Concern    Not on file     Social History Narrative         ALLERGIES: Review of patient's allergies indicates no known allergies.     Review of Systems Constitutional: Negative. HENT: Negative. Eyes: Negative. Respiratory: Negative. Cardiovascular: Negative. Gastrointestinal: Negative. Endocrine: Negative. Genitourinary: Negative. Musculoskeletal: Negative. Skin: Negative. Allergic/Immunologic: Negative. Neurological: Positive for seizures. Hematological: Negative. Psychiatric/Behavioral: Negative. Vitals:    07/03/17 1349 07/03/17 1430   BP: 123/57 123/67   Pulse: (!) 111 98   Resp: 13 20   Temp: 99 °F (37.2 °C)    SpO2: 97% 100%   Weight: 80.7 kg (178 lb)             Physical Exam   Constitutional: He appears well-developed and well-nourished. No distress. HENT:   Head: Normocephalic and atraumatic. Eyes: Pupils are equal, round, and reactive to light. Neck: Normal range of motion. Neck supple. Cardiovascular: Normal rate and regular rhythm. Pulmonary/Chest: Effort normal and breath sounds normal.   Abdominal: Soft. Bowel sounds are normal. There is no tenderness. There is no rebound. Genitourinary:   Genitourinary Comments: NE   Musculoskeletal: Normal range of motion. Neurological: No cranial nerve deficit. He exhibits normal muscle tone. Coordination normal.   Pt is sleeping in a post ictal state. Skin: Skin is warm and dry. Psychiatric: He has a normal mood and affect. Nursing note and vitals reviewed. MDM  Number of Diagnoses or Management Options  Seizures [facility]):   Diagnosis management comments: CONSULT:  Discussed the Pt with Dr Robert Sanchez, who accepted the Pt for admission. Casey Grajeda NP  4:04 PM    PROGRESS NOTE:  Pt had less than one minutes absence seizure - typical of his normal seizures. This however was a breakthrough seizure as the Pt was sleeping off the Versed he had been given for previous grand mal earlier today.   Casey Grajeda NP   5:05 PM           Amount and/or Complexity of Data Reviewed  Clinical lab tests: ordered and reviewed    Risk of Complications, Morbidity, and/or Mortality  Presenting problems: moderate  Diagnostic procedures: moderate  Management options: moderate    Patient Progress  Patient progress: stable    ED Course       Procedures    Diagnosis:   1. Seizures ([address]          Disposition:   ADMITTED - DR MONROE    Follow-up Information     None          Patient's Medications   Start Taking    No medications on file   Continue Taking    CARBAMAZEPINE XR (TEGRETOL XR) 400 MG SR TABLET    Take 1 Tab by mouth two (2) times a day. LAMOTRIGINE (LAMICTAL) 100 MG TABLET    Take 1 Tab by mouth two (2) times a day. LAMOTRIGINE (LAMICTAL) 50 MG DISINTEGRATING TABLET    Take 1 Tab by mouth two (2) times a day. LEVETIRACETAM (KEPPRA) 500 MG TABLET    Take 1 Tab by mouth two (2) times a day.    These Medications have changed    No medications on file   Stop Taking    No medications on file

## 2017-07-03 NOTE — ED NOTES
Patient noted to have rapid eye movements and then entire head turned to the right, limb rigidity of arms and legs and then hands postured inward and then noted tonic clonic activity with drooling, tachycardia 136.

## 2017-07-03 NOTE — ED NOTES
Patient noted to have tonic clonic type seizure activity lasting 1 minute 27 seconds. Dr. Vanessa Sung notified and Ativan ordered.

## 2017-07-03 NOTE — ED NOTES
Pt resting in position of comfort, pts mother @bs.  Pt remains in view of nurses station in ed rm 07

## 2017-07-04 LAB
ANION GAP BLD CALC-SCNC: 10 MMOL/L (ref 3–18)
BASOPHILS # BLD AUTO: 0 K/UL (ref 0–0.06)
BASOPHILS # BLD: 0 % (ref 0–2)
BUN SERPL-MCNC: 20 MG/DL (ref 7–18)
BUN/CREAT SERPL: 8 (ref 12–20)
CALCIUM SERPL-MCNC: 8.3 MG/DL (ref 8.5–10.1)
CHLORIDE SERPL-SCNC: 112 MMOL/L (ref 100–108)
CK SERPL-CCNC: 5286 U/L (ref 39–308)
CO2 SERPL-SCNC: 22 MMOL/L (ref 21–32)
CREAT SERPL-MCNC: 2.44 MG/DL (ref 0.6–1.3)
DIFFERENTIAL METHOD BLD: ABNORMAL
EOSINOPHIL # BLD: 0 K/UL (ref 0–0.4)
EOSINOPHIL NFR BLD: 0 % (ref 0–5)
ERYTHROCYTE [DISTWIDTH] IN BLOOD BY AUTOMATED COUNT: 14.4 % (ref 11.6–14.5)
GLUCOSE SERPL-MCNC: 103 MG/DL (ref 74–99)
HCT VFR BLD AUTO: 36.9 % (ref 36–48)
HGB BLD-MCNC: 12.2 G/DL (ref 13–16)
LYMPHOCYTES # BLD AUTO: 12 % (ref 21–52)
LYMPHOCYTES # BLD: 1.3 K/UL (ref 0.9–3.6)
MCH RBC QN AUTO: 28.2 PG (ref 24–34)
MCHC RBC AUTO-ENTMCNC: 33.1 G/DL (ref 31–37)
MCV RBC AUTO: 85.2 FL (ref 74–97)
MONOCYTES # BLD: 1.6 K/UL (ref 0.05–1.2)
MONOCYTES NFR BLD AUTO: 15 % (ref 3–10)
NEUTS SEG # BLD: 7.6 K/UL (ref 1.8–8)
NEUTS SEG NFR BLD AUTO: 73 % (ref 40–73)
PLATELET # BLD AUTO: 185 K/UL (ref 135–420)
PMV BLD AUTO: 10.2 FL (ref 9.2–11.8)
POTASSIUM SERPL-SCNC: 3.8 MMOL/L (ref 3.5–5.5)
RBC # BLD AUTO: 4.33 M/UL (ref 4.7–5.5)
SODIUM SERPL-SCNC: 144 MMOL/L (ref 136–145)
WBC # BLD AUTO: 10.5 K/UL (ref 4.6–13.2)

## 2017-07-04 PROCEDURE — 81001 URINALYSIS AUTO W/SCOPE: CPT | Performed by: INTERNAL MEDICINE

## 2017-07-04 PROCEDURE — 74011250637 HC RX REV CODE- 250/637: Performed by: PSYCHIATRY & NEUROLOGY

## 2017-07-04 PROCEDURE — 82550 ASSAY OF CK (CPK): CPT | Performed by: PSYCHIATRY & NEUROLOGY

## 2017-07-04 PROCEDURE — 77030020263 HC SOL INJ SOD CL0.9% LFCR 1000ML

## 2017-07-04 PROCEDURE — 80048 BASIC METABOLIC PNL TOTAL CA: CPT | Performed by: HOSPITALIST

## 2017-07-04 PROCEDURE — 65660000000 HC RM CCU STEPDOWN

## 2017-07-04 PROCEDURE — 74011250636 HC RX REV CODE- 250/636: Performed by: HOSPITALIST

## 2017-07-04 PROCEDURE — 36415 COLL VENOUS BLD VENIPUNCTURE: CPT | Performed by: HOSPITALIST

## 2017-07-04 PROCEDURE — 74011250637 HC RX REV CODE- 250/637: Performed by: HOSPITALIST

## 2017-07-04 PROCEDURE — 85025 COMPLETE CBC W/AUTO DIFF WBC: CPT | Performed by: HOSPITALIST

## 2017-07-04 RX ORDER — LEVETIRACETAM 500 MG/1
500 TABLET ORAL 2 TIMES DAILY
Status: DISCONTINUED | OUTPATIENT
Start: 2017-07-04 | End: 2017-07-07 | Stop reason: HOSPADM

## 2017-07-04 RX ADMIN — LAMOTRIGINE 300 MG: 100 TABLET ORAL at 08:16

## 2017-07-04 RX ADMIN — SODIUM CHLORIDE 150 ML/HR: 900 INJECTION, SOLUTION INTRAVENOUS at 16:51

## 2017-07-04 RX ADMIN — CARBAMAZEPINE 400 MG: 200 TABLET, EXTENDED RELEASE ORAL at 18:09

## 2017-07-04 RX ADMIN — LEVETIRACETAM 1000 MG: 10 INJECTION INTRAVENOUS at 05:48

## 2017-07-04 RX ADMIN — ENOXAPARIN SODIUM 40 MG: 40 INJECTION SUBCUTANEOUS at 20:36

## 2017-07-04 RX ADMIN — LEVETIRACETAM 500 MG: 500 TABLET ORAL at 20:36

## 2017-07-04 RX ADMIN — LAMOTRIGINE 300 MG: 100 TABLET ORAL at 18:09

## 2017-07-04 RX ADMIN — CARBAMAZEPINE 400 MG: 200 TABLET, EXTENDED RELEASE ORAL at 08:16

## 2017-07-04 NOTE — PROGRESS NOTES
Mother's contact number is [phone]  2143> Assisted patient to bedside commode. Tolerated well.  0000> No further seizures noted  0400> No seizures noted. 0720> Bedside and Verbal shift change report given to Dianne Jackson RN (oncoming nurse) by Renetta Forrester RN (offgoing nurse). Report included the following information SBAR, Kardex, Intake/Output, MAR and Recent Results.

## 2017-07-04 NOTE — PROGRESS NOTES
Progress Note      Patient: Alejandra Williamson               Sex: male          DOA: 7/3/2017       YOB: 1995      Age:  24 y.o.        LOS:  LOS: 1 day             CHIEF COMPLAINT:  Seizures with acute renal failure    Subjective:     No seizures today. Patient is awake and talking    Objective:      Visit Vitals    /67    Pulse 87    Temp 98.2 °F (36.8 °C)    Resp 19    Wt 80.7 kg (178 lb)    SpO2 100%    BMI 24.83 kg/m2       Physical Exam:  Gen:  No distress, no complaint  Lungs:  Clear bilaterally, no wheeze or rhonchi  Heart:  Regular rate and rhythm, no murmurs or gallops  Abdomen:  Soft, non-tender, normal bowel sounds  Neuro:  Awake and talking. No distress        Lab/Data Reviewed:  BMP:   Lab Results   Component Value Date/Time     07/04/2017 03:30 AM    K 3.8 07/04/2017 03:30 AM     (H) 07/04/2017 03:30 AM    CO2 22 07/04/2017 03:30 AM    AGAP 10 07/04/2017 03:30 AM     (H) 07/04/2017 03:30 AM    BUN 20 (H) 07/04/2017 03:30 AM    CREA 2.44 (H) 07/04/2017 03:30 AM    GFRAA 41 (L) 07/04/2017 03:30 AM    GFRNA 34 (L) 07/04/2017 03:30 AM     CBC:   Lab Results   Component Value Date/Time    WBC 10.5 07/04/2017 03:30 AM    HGB 12.2 (L) 07/04/2017 03:30 AM    HCT 36.9 07/04/2017 03:30 AM     07/04/2017 03:30 AM           Assessment/Plan     Principal Problem:    Seizures ([address] (7/3/2017)    Active Problems:    Seizure disorder ([facility] [address] (7/3/2017)    Acute renal failure    Plan:  Transfer out of ICU  Monitor renal function, patient continues on on IVF, will increase rate to 150 cc/hr. Discussed with mother at the bedside.   He has not had renal issues in the past.

## 2017-07-04 NOTE — PROGRESS NOTES
16:40--Patient transferred from ICU to room 3035 via wheelchair at this time. Alert and oriented x3. No c/o at this time. Call light in reach and instructed to use it. Continue to monitor. Assessment completed- see flow sheet. 18:30-No change in condition. No seizure activity noted up to this time.    19:30- Report to oncoming nurse given

## 2017-07-04 NOTE — CONSULTS
Chart reviewed. Patient has had a progressive rise in Cr since admission yesterday -> Cr up to 2.4mg/dl when last checked. I'm fairly certain this MEENA is tied in with the seizure activity -> he came in yesterday having experienced at least 3 seizures over the course of the day. Multiple seizures like this can definitely result in MEENA, typically by causing rhabdomyolysis. I suspect this is likely the case here. CPK level was, in fact,  elevated ~ 5K when it was checked this am.      RECS  - check urinalysis to see if this supports diagnosis of rhabdo. - trend CPK level to verify that it is coming down as expected. - I agree with pushing fluids for now to ensure that there is no element of volume depletion. With rhabdo, maintaining brisk urine flow is also very important. - patient has previous history of seizures and mom reports he wasn't taking his medications so this would explain the increase in seizure activity. CT head essentially negative. We should check a urine tox screen to rule this out as contributing factor. - I will check renal US just to rule out any structural kidney disease (unlikely). - this MEENA will resolve, hopefully sooner rather than later. GFR should make a full recover with time. Thanks, following closely with you. Call me with any questions please.

## 2017-07-04 NOTE — PROGRESS NOTES
Neurology Progress Note      Patient: Ignacio Carballo MRN: [id number]  SSN: [id number]    YOB: 1995  Age: 24 y.o. Sex: male      Admit Date: 7/3/2017    LOS: 1 day     Subjective:     Chief Complaint:  Seizures ([facility] [address]  No seizure over night. More aware this am. Was able to take all his po medications. Creatinine still rising. Getting IV Fluids. Said he probably missed 2 days worth of his medications since he run out !! He did not know the name of any of his medications. CT head with no acute findings. Objective:     Vitals:    07/04/17 0700 07/04/17 0800 07/04/17 0900 07/04/17 1000   BP: 115/66 119/69 131/84 110/57   Pulse: 87 93 89 93   Resp: 16 20 20 20   Temp:  98.2 °F (36.8 °C)     SpO2: 99% 99% 99% 99%   Weight:                Physical Exam:   Awake, oriented to place and date. I suspect some cognitive slowing. Moves all extremities symmetrically. Lab/Data Review: labs reviewed. Review of systems: Denies headaches or chest pain. Medications Reviewed:  Current Facility-Administered Medications   Medication Dose Route Frequency    0.9% sodium chloride infusion  125 mL/hr IntraVENous CONTINUOUS    carBAMazepine XR (TEGretol XR) tablet 400 mg  400 mg Oral BID    enoxaparin (LOVENOX) injection 40 mg  40 mg SubCUTAneous Q24H    levETIRAcetam (KEPPRA) 1,000 mg in 100 ml IVPB  1,000 mg IntraVENous Q12H    lamoTRIgine (LaMICtal) tablet 300 mg  300 mg Oral BID     Past Medical History:   Diagnosis Date    Convulsions, epileptic ([facility] [address]     Hallucination, visual     Learning difficulty     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      Social History Main Topics    Smoking status: Never Smoker    Smokeless tobacco: Never Used    Alcohol use No    Drug use: No    Sexual activity: Not Currently     Other Topics Concern    Not on file     Social History Narrative         CT Results (most recent):    Results from Hospital Encounter encounter on 07/03/17   CT HEAD WO CONT   Narrative EXAM: CT head    INDICATION: Persistent drowsiness since 3 generalized seizures earlier today. COMPARISON: Brain MRI 1/28/2016. TECHNIQUE: Axial CT imaging of the head  was obtained from skull base to vertex  without intravenous contrast.    One or more dose reduction techniques were used on this CT: automated exposure  control, adjustment of the mAs and/or kVp according to patient's size, and  iterative reconstruction techniques. The specific techniques utilized on this CT  exam have been documented in the patient's electronic medical record.    _______________    FINDINGS:    BRAIN AND POSTERIOR FOSSA: The sulci, folia, ventricles and basal cisterns are  within normal limits for the patient?s age. There is no intracranial hemorrhage,  mass effect, or shift of midline structures. There are no areas of abnormal  parenchymal attenuation. EXTRA-AXIAL SPACES AND MENINGES: There are no abnormal extra-axial fluid  collections. CALVARIUM: No acute osseous abnormality. SINUSES: The visualized portions of the paranasal sinuses and mastoid air cells  are well aerated. OTHER: None.    _______________         Impression IMPRESSION:    No acute intracranial abnormalities are identified. No CT evidence to suggest  acute intracranial hematoma, cortical infarct, or mass effect/mass lesion. Assessment/Plan:     Principal Problem:    Seizures ([facility] [address] (7/3/2017)    Active Problems:    Seizure disorder ([facility] [address] (7/3/2017)      Epilepsy. ? Primary generalized epilepsy. Recent non-compliance resulted in 3 breakthrough generalized convulsions. Will change his IV Keppra to his home oral doses of 500 mg bid. Continue Lamictal and tegretol. Acute renal failure ? Dehydration ? Due to rhabdomyolysis from seizures.  Check CK, continue hydration. primary team to manage/address any other causes.         Signed By: Justin Vallejo MD     July 4, 2017

## 2017-07-04 NOTE — PROGRESS NOTES
Patient has designated ______________mother__________ to participate in his/her discharge plan and to receive any needed information.      Name: David Bunn  Address:  Phone [address]

## 2017-07-04 NOTE — ROUTINE PROCESS
0700: Assumed care of pt at this time. Received verbal bedside shift report from Anthony [id number] (offgoing) to Candice Mariscal RN (oncoming). 1400: Attempted to call back, was told accepting nurse will return phone call.

## 2017-07-04 NOTE — PROGRESS NOTES
[facility]   Discharge Planning/ Assessment    Reasons for Intervention: Spoke with pt, lives with mother. requries supervision  With adls. amb independently. Demographics and pcp correct. . [place] home.     High Risk Criteria  [x] Yes  []No   Physician Referral  [] Yes  [x]No        Date    Nursing Referral  [] Yes  [x]No        Date    Patient/Family Request  [] Yes  [x]No        Date       Resources:    Medicare  [] Yes  []No   Medicaid  [] Yes  []No   No Resources  [] Yes  []No   Private Insurance  [x] Yes  []No    Name/Phone Number    Other  [] Yes  []No        (i.e. Workman's Comp)         Prior Services:    Prior Services  [] Yes  [x]No   Home Health  [] Yes  []No   [address] [place]  [] Yes  []No        Number of [address]  [] Yes  []No       Meals on Wheels  [] Yes  []No   Office on Aging  [] Yes  []No   Transportation Services  [] Yes  []No   Nursing Home  [] Yes  []No        Nursing Home Name    [facility]  [] Yes  []No        [address] Name    Other       Information Source:      Information obtained from  [] Patient  [x] Parent   [] [address]  [] Child  [] Spouse   [] Significant Other/Partner   [] Friend      [] EMS    [] Nursing Home Chart          [] Other:   Chart Review  [] Yes  []No     Family/Support System:    Patient lives with  [] Alone    [] Spouse   [] Significant Other  [] Children  [] Caretaker   [x] Parent  [] Sibling     [] Other       Other Support System:    Is the patient responsible for care of others  [] Yes  [x]No   Information of person caring for patient on  discharge    Managers financial affairs independently  [] Yes  [x]No   If no, explain:      Status Prior to Admission:    Mental Status  [] Awake  [] Alert  [x] Oriented  [] Quiet/Calm [] Lethargic/Sedated   [] Disoriented  [] Restless/Anxious  [] Combative   Personal Care  [] Dependent  [] Independent Personal Care  [x] Requires Assistance Meal Preparation Ability  [] Independent   [] Standby Assistance   [] Minimal Assistance   [] Moderate Assistance  [] Maximum Assistance     [x] Total Assistance   Chores  [] Independent with Chores   [] N/A Nursing Home Resident   [x] Requires Assistance   Bowel/Bladder  [x] Continent  [] Catheter  [] Incontinent  [] Ostomy Self-Care    [] Urine Diversion Self-Care  [] Maximum Assistance     [] Total Assistance   Number of Persons needed for assistance    DME at home  [] [address], Valentino Ip  [] [address], Straight   [] Commode    [] Bathroom/Grab Bars  [] Hospital Bed  [] Nebulizer  [] Oxygen           [] Raised Toilet Seat  [] Shower Chair  [] Side Rails for Bed   [] Tub Transfer Bench   [] Shauna Burnett  [] Carlos Alvarez      [] Other:   Vendor      Treatment Presently Receiving:    Current Treatments  [] Chemotherapy  [] Dialysis  [] Insulin  [] IVAB [] IVF   [] O2  [] PCA   [] PT   [] RT   [] Tube Feedings   [] Wound Care     Psychosocial Evaluation:    Verbalized Knowledge of Disease Process  [x] Patient  [x]Family   Coping with Disease Process  [] Patient  []Family   Requires Further Counseling Coping with Disease Process  [] Patient  []Family     Identified Projected Needs:    Home Health Aid  [] Yes  []No   Transportation  [] Yes  []No   Education  [] Yes  []No        Specific Education     Financial Counseling  [] Yes  []No   Inability to Care for Self/Will Require 24 hour care  [] Yes  []No   Pain Management  [] Yes  []No   Home Infusion Therapy  [] Yes  []No   Oxygen Therapy  [] Yes  []No   DME  [] Yes  []No   Long Term Care Placement  [] Yes  []No   Rehab  [] Yes  []No   Physical Therapy  [] Yes  []No   Needs Anticipated At This Time  [] Yes  [x]No     Intra-Hospital Referral:    [facility]  [] Yes  [x]No     [] Yes  [x]No   Patient Representative  [] Yes  [x]No   Staff for Teaching Needs  [] Yes  [x]No   Specialty Teaching Needs     Diabetic Educator  [] Yes  [x]No   Referral for Diabetic Educator Needed  [] Yes  [x]No  If Yes, place order for Nutritionist or Diabetic Consult     Tentative Discharge Plan:    Home with No Services  [x] Yes  []No   Home with [address]  [] Yes  []No        If Yes, specify type    [address]  [] Yes  []No        If Yes, specify type    Meals on Wheels  [] Yes  []No   Office of Aging  [] Yes  []No   NHP  [] Yes  []No   Return to the [address]  [] Yes  []No   Rehab Therapy  [] Yes  []No   Acute Rehab  [] Yes  []No   Subacute Rehab  [] Yes  []No   Private Care  [] Yes  []No   Substance Abuse Referral  [] Yes  []No   Transportation  [] Yes  []No   Chore Service  [] Yes  []No   Inpatient Hospice  [] Yes  []No   OP RT  [] Yes  [] No   OP Hemo  [] Yes  [] No   OP PT  [] Yes  []No   Support Group  [] Yes  []No   Reach to Recovery  [] Yes  []No   OP Oncology Clinic  [] Yes  []No   Clinic Appointment  [] Yes  []No   DME  [] Yes  []No   Comments    Name of D/C Planner or  Given to Patient or Family Roxy fitzgerald rn cm    Phone Number [phone]        Extension    Date 7/4/17   Time    If you are discharged home, whom do you designate to participate in your discharge plan and receive any information needed?      Enter name of designee         Phone # of designee         Address of designee         Updated         Patient refused to designate any           individual

## 2017-07-05 ENCOUNTER — APPOINTMENT (OUTPATIENT)
Dept: ULTRASOUND IMAGING | Age: 22
DRG: 053 | End: 2017-07-05
Attending: INTERNAL MEDICINE
Payer: MEDICAID

## 2017-07-05 LAB
ALBUMIN SERPL BCP-MCNC: 3.2 G/DL (ref 3.4–5)
ANION GAP BLD CALC-SCNC: 8 MMOL/L (ref 3–18)
APPEARANCE UR: CLEAR
BACTERIA URNS QL MICRO: NEGATIVE /HPF
BILIRUB UR QL: NEGATIVE
BUN SERPL-MCNC: 13 MG/DL (ref 7–18)
BUN/CREAT SERPL: 6 (ref 12–20)
CALCIUM SERPL-MCNC: 8 MG/DL (ref 8.5–10.1)
CHLORIDE SERPL-SCNC: 114 MMOL/L (ref 100–108)
CK SERPL-CCNC: ABNORMAL U/L (ref 39–308)
CK SERPL-CCNC: ABNORMAL U/L (ref 39–308)
CO2 SERPL-SCNC: 23 MMOL/L (ref 21–32)
COLOR UR: YELLOW
CREAT SERPL-MCNC: 2.12 MG/DL (ref 0.6–1.3)
EPITH CASTS URNS QL MICRO: NEGATIVE /LPF (ref 0–5)
GLUCOSE SERPL-MCNC: 96 MG/DL (ref 74–99)
GLUCOSE UR STRIP.AUTO-MCNC: NEGATIVE MG/DL
HGB UR QL STRIP: ABNORMAL
KETONES UR QL STRIP.AUTO: NEGATIVE MG/DL
LEUKOCYTE ESTERASE UR QL STRIP.AUTO: NEGATIVE
NITRITE UR QL STRIP.AUTO: NEGATIVE
PH UR STRIP: 5.5 [PH] (ref 5–8)
PHOSPHATE SERPL-MCNC: 3.1 MG/DL (ref 2.5–4.9)
POTASSIUM SERPL-SCNC: 3.6 MMOL/L (ref 3.5–5.5)
PROT UR STRIP-MCNC: NEGATIVE MG/DL
RBC #/AREA URNS HPF: NORMAL /HPF (ref 0–5)
SODIUM SERPL-SCNC: 145 MMOL/L (ref 136–145)
SP GR UR REFRACTOMETRY: 1.01 (ref 1–1.03)
UROBILINOGEN UR QL STRIP.AUTO: 0.2 EU/DL (ref 0.2–1)
WBC URNS QL MICRO: NORMAL /HPF (ref 0–4)

## 2017-07-05 PROCEDURE — 76775 US EXAM ABDO BACK WALL LIM: CPT

## 2017-07-05 PROCEDURE — 80069 RENAL FUNCTION PANEL: CPT | Performed by: INTERNAL MEDICINE

## 2017-07-05 PROCEDURE — 74011250636 HC RX REV CODE- 250/636: Performed by: HOSPITALIST

## 2017-07-05 PROCEDURE — 36415 COLL VENOUS BLD VENIPUNCTURE: CPT | Performed by: INTERNAL MEDICINE

## 2017-07-05 PROCEDURE — 74011250637 HC RX REV CODE- 250/637: Performed by: HOSPITALIST

## 2017-07-05 PROCEDURE — 74011250637 HC RX REV CODE- 250/637: Performed by: PSYCHIATRY & NEUROLOGY

## 2017-07-05 PROCEDURE — 65660000000 HC RM CCU STEPDOWN

## 2017-07-05 PROCEDURE — 82550 ASSAY OF CK (CPK): CPT | Performed by: INTERNAL MEDICINE

## 2017-07-05 PROCEDURE — 74011250637 HC RX REV CODE- 250/637: Performed by: FAMILY MEDICINE

## 2017-07-05 PROCEDURE — 77030020263 HC SOL INJ SOD CL0.9% LFCR 1000ML

## 2017-07-05 RX ORDER — DEXTROMETHORPHAN HYDROBROMIDE, GUAIFENESIN 5; 100 MG/5ML; MG/5ML
650 LIQUID ORAL
Status: DISCONTINUED | OUTPATIENT
Start: 2017-07-05 | End: 2017-07-07 | Stop reason: HOSPADM

## 2017-07-05 RX ORDER — DEXTROMETHORPHAN HYDROBROMIDE, GUAIFENESIN 5; 100 MG/5ML; MG/5ML
650 LIQUID ORAL EVERY 8 HOURS
Status: DISCONTINUED | OUTPATIENT
Start: 2017-07-05 | End: 2017-07-05

## 2017-07-05 RX ADMIN — CARBAMAZEPINE 400 MG: 200 TABLET, EXTENDED RELEASE ORAL at 17:27

## 2017-07-05 RX ADMIN — LEVETIRACETAM 500 MG: 500 TABLET ORAL at 09:22

## 2017-07-05 RX ADMIN — SODIUM CHLORIDE 150 ML/HR: 900 INJECTION, SOLUTION INTRAVENOUS at 15:18

## 2017-07-05 RX ADMIN — ENOXAPARIN SODIUM 40 MG: 40 INJECTION SUBCUTANEOUS at 21:38

## 2017-07-05 RX ADMIN — LAMOTRIGINE 300 MG: 100 TABLET ORAL at 17:27

## 2017-07-05 RX ADMIN — SODIUM CHLORIDE 150 ML/HR: 900 INJECTION, SOLUTION INTRAVENOUS at 06:29

## 2017-07-05 RX ADMIN — CARBAMAZEPINE 400 MG: 200 TABLET, EXTENDED RELEASE ORAL at 09:21

## 2017-07-05 RX ADMIN — LAMOTRIGINE 300 MG: 100 TABLET ORAL at 09:22

## 2017-07-05 RX ADMIN — SODIUM CHLORIDE 150 ML/HR: 900 INJECTION, SOLUTION INTRAVENOUS at 21:38

## 2017-07-05 RX ADMIN — LEVETIRACETAM 500 MG: 500 TABLET ORAL at 17:27

## 2017-07-05 RX ADMIN — SODIUM CHLORIDE 150 ML/HR: 900 INJECTION, SOLUTION INTRAVENOUS at 00:11

## 2017-07-05 RX ADMIN — ACETAMINOPHEN 650 MG: 650 TABLET, FILM COATED, EXTENDED RELEASE ORAL at 05:21

## 2017-07-05 RX ADMIN — ACETAMINOPHEN 650 MG: 650 TABLET, FILM COATED, EXTENDED RELEASE ORAL at 17:27

## 2017-07-05 NOTE — ROUTINE PROCESS
1545 assumed care of pt after bedside verbal report was given by off going nurse, pt asleep in bed quietly, normal saline infusing at 150 ml/hr, no acute distress noted or signs of pain, will monitor     0925 pt off unit to ultrasound    1018 pt back in bed, resting awake, family at bedside, will monitor     1227 pt awake eating lunch, no distress    1543 Bedside and Verbal shift change report given to PARK Lauren (oncoming nurse) by Kristy NICK (offgoing nurse). Report included the following information SBAR, Kardex and MAR.

## 2017-07-05 NOTE — PROGRESS NOTES
Progress Note      Patient: Debora Bird               Sex: male          DOA: 7/3/2017       YOB: 1995      Age:  24 y.o.        LOS:  LOS: 2 days             CHIEF COMPLAINT:  Seizures with acute renal failure    Subjective:     Awake and alert  No further seizure    Objective:      Visit Vitals    /73    Pulse 90    Temp 97.8 °F (36.6 °C)    Resp 18    Wt 79.1 kg (174 lb 6.1 oz)    SpO2 100%    BMI 24.32 kg/m2       Physical Exam:  Gen:  No distress, no complaint  Lungs:  Clear bilaterally, no wheeze or rhonchi  Heart:  Regular rate and rhythm, no murmurs or gallops  Abdomen:  Soft, non-tender, normal bowel sounds        Lab/Data Reviewed:  BMP:   Lab Results   Component Value Date/Time     07/05/2017 05:05 AM    K 3.6 07/05/2017 05:05 AM     (H) 07/05/2017 05:05 AM    CO2 23 07/05/2017 05:05 AM    AGAP 8 07/05/2017 05:05 AM    GLU 96 07/05/2017 05:05 AM    BUN 13 07/05/2017 05:05 AM    CREA 2.12 (H) 07/05/2017 05:05 AM    GFRAA 48 (L) 07/05/2017 05:05 AM    GFRNA 40 (L) 07/05/2017 05:05 AM           Assessment/Plan     Principal Problem:    Seizures ([facility] [address] (7/3/2017)    Active Problems:    Seizure disorder ([facility] [address] (7/3/2017)    Acute renal failure  Rhabdomyolysis    Plan:  Continue with anticonvulsant  Following renal function  Hopeful for discharge soon, possibly tomorrow based on renal function.

## 2017-07-05 NOTE — PROGRESS NOTES
Neurology Progress Note      Patient: Kapil Flower MRN: [id number]  SSN: [id number]    YOB: 1995  Age: 24 y.o. Sex: male      Admit Date: 7/3/2017    LOS: 2 days     Subjective:     Chief Complaint:  Seizures ([address]  No seizures. No new c/o. Sister at bedside. CK was elevated so he had rhabdomyolysis from the seizures. On IV fluids. Objective:     Vitals:    07/05/17 0212 07/05/17 0603 07/05/17 0907 07/05/17 1320   BP: 112/69 115/79 110/69 121/73   Pulse: 77 90 74 90   Resp: 17 16 15 18   Temp: 99 °F (37.2 °C) 99.2 °F (37.3 °C) 97.7 °F (36.5 °C) 97.8 °F (36.6 °C)   SpO2: 97% 97% 100% 100%   Weight:  79.1 kg (174 lb 6.1 oz)              Physical Exam:   Awake, oriented to place and date. I suspect some cognitive slowing. Moves all extremities symmetrically. Lab/Data Review: labs reviewed. Review of systems: Denies headaches or chest pain. Medications Reviewed:  Current Facility-Administered Medications   Medication Dose Route Frequency    acetaminophen (TYLENOL) SR tablet 650 mg  650 mg Oral TID PRN    levETIRAcetam (KEPPRA) tablet 500 mg  500 mg Oral BID    0.9% sodium chloride infusion  150 mL/hr IntraVENous CONTINUOUS    carBAMazepine XR (TEGretol XR) tablet 400 mg  400 mg Oral BID    enoxaparin (LOVENOX) injection 40 mg  40 mg SubCUTAneous Q24H    lamoTRIgine (LaMICtal) tablet 300 mg  300 mg Oral BID     Past Medical History:   Diagnosis Date    Convulsions, epileptic ([address]     Hallucination, visual     Learning difficulty     Refractory epilepsy ([address] 2003     Social History     Social History    Marital status: SINGLE     Spouse name: N/A    Number of children: N/A    Years of education: N/A     Occupational History    Not on file.      Social History Main Topics    Smoking status: Never Smoker    Smokeless tobacco: Never Used    Alcohol use No    Drug use: No    Sexual activity: Not Currently     Other Topics Concern    Not on file     Social History Narrative         CT Results (most recent):    Results from Hospital Encounter encounter on 07/03/17   CT HEAD WO CONT   Narrative EXAM: CT head    INDICATION: Persistent drowsiness since 3 generalized seizures earlier today. COMPARISON: Brain MRI 1/28/2016. TECHNIQUE: Axial CT imaging of the head  was obtained from skull base to vertex  without intravenous contrast.    One or more dose reduction techniques were used on this CT: automated exposure  control, adjustment of the mAs and/or kVp according to patient's size, and  iterative reconstruction techniques. The specific techniques utilized on this CT  exam have been documented in the patient's electronic medical record.    _______________    FINDINGS:    BRAIN AND POSTERIOR FOSSA: The sulci, folia, ventricles and basal cisterns are  within normal limits for the patient?s age. There is no intracranial hemorrhage,  mass effect, or shift of midline structures. There are no areas of abnormal  parenchymal attenuation. EXTRA-AXIAL SPACES AND MENINGES: There are no abnormal extra-axial fluid  collections. CALVARIUM: No acute osseous abnormality. SINUSES: The visualized portions of the paranasal sinuses and mastoid air cells  are well aerated. OTHER: None.    _______________         Impression IMPRESSION:    No acute intracranial abnormalities are identified. No CT evidence to suggest  acute intracranial hematoma, cortical infarct, or mass effect/mass lesion. Assessment/Plan:     Principal Problem:    Seizures ([address] (7/3/2017)    Active Problems:    Seizure disorder ([address] (7/3/2017)      Epilepsy. ? Primary generalized epilepsy. Recent non-compliance resulted in 3 breakthrough generalized convulsions. Continue Lamictal , keppra and tegretol. To follow with his private neurologist.    Acute renal failure Due to rhabdomyolysis from seizures. Management as per primary team.    Will sign off. Available on pager if needed.     Signed By: Ish Danielson MD     July 5, 2017

## 2017-07-05 NOTE — CONSULTS
Consult Note    Assessment:   · MEENA. Etio- seizure disorder causing rhabdomyolysis. Non oliguric, renal function began to improve. · Seizure disorder. · Rhabdomyolysis. Recommendations:   · Continue NS at 150 ml/hour. · Continue to monitor CPK, it may rise further. · Check phos. · Avoid NSAID's, IV dye. · Avoid fleets enemas due to concern for acute phosphate nephropathy. · Please dose all medications for approximate creatinine clearance  45-30. Thank you. Consult requested by: Alton Freedman MD    ADMIT DATE: 7/3/2017  CONSULT DATE: July 5, 2017                 Admission diagnosis: Seizures [facility])   Reason for Nephrology Consultation: MEENA. HPI: Oliva Rogel is a 24 y.o. male  with h/o of seizure disorder who presented to ED after having recurrent seizures at home. His mother was out of town and he may have ran out of his anticonvulsives (he is evasive about it). Patient was found hemodynamically stable. He was restarted on kepra, tegretol and lamictal and there has been no recurrence of seizures. No prior h/o of kidney disease. Scr was 1.08 upon admission, up to 2.44 yesterday and slightly better at 2.12 today. He has been receiving ivf. CPK was 5200 yesterday, 82745 today. 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 No pertinent surgical history. Social History     Social History    Marital status: SINGLE     Spouse name: N/A    Number of children: N/A    Years of education: N/A     Occupational History    Not on file.      Social History Main Topics    Smoking status: Never Smoker    Smokeless tobacco: Never Used    Alcohol use No    Drug use: No    Sexual activity: Not Currently     Other Topics Concern    Not on file     Social History Narrative       Family History   Problem Relation Age of Onset    No Known Problems Mother     No Known Problems Sister     No Known Problems Brother     Cancer Maternal Uncle     Seizures Maternal Uncle     Seizures Paternal Uncle     Cancer Maternal Grandmother     Diabetes Maternal Grandfather     High Cholesterol Maternal Grandfather     Seizures Other      Paternal Cousin, male    No Known Problems Sister     Cancer Other      No Known Allergies     Home Medications:     Prescriptions Prior to Admission   Medication Sig    carBAMazepine XR (TEGRETOL XR) 400 mg SR tablet Take 1 Tab by mouth two (2) times a day.  levETIRAcetam (KEPPRA) 500 mg tablet Take 1 Tab by mouth two (2) times a day.  lamoTRIgine (LAMICTAL) 100 mg tablet Take 1 Tab by mouth two (2) times a day. (Patient taking differently: Take 100 mg by mouth two (2) times a day. Indications: take 3 tablets)    lamoTRIgine (LAMICTAL) 50 mg disintegrating tablet Take 1 Tab by mouth two (2) times a day. Current Inpatient Medications:     Current Facility-Administered Medications   Medication Dose Route Frequency    acetaminophen (TYLENOL) SR tablet 650 mg  650 mg Oral TID PRN    levETIRAcetam (KEPPRA) tablet 500 mg  500 mg Oral BID    0.9% sodium chloride infusion  150 mL/hr IntraVENous CONTINUOUS    carBAMazepine XR (TEGretol XR) tablet 400 mg  400 mg Oral BID    enoxaparin (LOVENOX) injection 40 mg  40 mg SubCUTAneous Q24H    lamoTRIgine (LaMICtal) tablet 300 mg  300 mg Oral BID       Review of Systems:   No fever or chills. No sore throat. No cough or hemoptysis. No shortness of breath or chest pain. No orthopnea or paroxysmal nocturnal dyspnea. Good appetite. No nausea, vomiting, abdominal pain, melena or hematochezia. No constipation or diarrhea. No dysuria, no gross hematuria of voiding difficulties. No ankle swelling, no joint paints. No muscle aches. No skin changes. No dizziness or lightheadedness. No headaches.        Physical Assessment:     Vitals:    07/04/17 2125 07/05/17 7384 07/05/17 0603 07/05/17 0907   BP: 130/49 112/69 115/79 110/69   Pulse: 82 77 90 74   Resp: 18 17 16 15   Temp: 98.5 °F (36.9 °C) 99 °F (37.2 °C) 99.2 °F (37.3 °C) 97.7 °F (36.5 °C)   SpO2: 100% 97% 97% 100%   Weight:   79.1 kg (174 lb 6.1 oz)      Last 3 Recorded Weights in this Encounter    07/03/17 1349 07/05/17 0603   Weight: 80.7 kg (178 lb) 79.1 kg (174 lb 6.1 oz)     Admission weight: Weight: 80.7 kg (178 lb) (07/03/17 1349)      Intake/Output Summary (Last 24 hours) at 07/05/17 1118  Last data filed at 07/05/17 0924   Gross per 24 hour   Intake          2785.83 ml   Output             2450 ml   Net           335.83 ml       Patient is in no apparent distress. HEENT: Head is normocephalic and atraumatic. Pupils are round, equal, reactive to light. Sclerae are anicteric. Oropharynx clear. Neck: no cervical lymphadenopathy or thyromegaly. Lungs: good air entry, clear to auscultation bilaterally. Trachea at the midline. Cardiovascular system: S1, S2, regular rate and rhythm. No murmurs, gallops or rubs. No jvd. Carotid upstroke 2 + bilaterally. Abdomen: soft, non tender, non distended. Positive bowel sounds. No hepatosplenomegaly. No abdominal bruits. Extremities: no clubbing, cyanosis or edema. Strong dorsalis pedis pulses. Brisk capillary refill on the toes bilaterally. Integumentary: skin is grossly intact. Neurologic: Alert, oriented time three. Cooperative and appropriate. No gross motor or sensory deficits.        Data Review:    Labs: Results:       Chemistry Recent Labs      07/05/17   0505  07/04/17   0330  07/03/17   1418   GLU  96  103*  142*   NA  145  144  140   K  3.6  3.8  3.9   CL  114*  112*  108   CO2  23  22  23   BUN  13  20*  19*   CREA  2.12*  2.44*  1.71*   CA  8.0*  8.3*  8.5   AGAP  8  10  9   BUCR  6*  8*  11*   ALB  3.2*   --    --    PHOS  3.1   --    --          CBC w/Diff Recent Labs      07/04/17   0330  07/03/17   1418  07/03/17   0450   WBC  10.5  12.7  9.2   RBC 4.33*  4.64*  5.18   HGB  12.2*  13.1  14.7   HCT  36.9  38.4  43.2   PLT  185  206  224   GRANS  73  81*  54   LYMPH  12*  6*  36   EOS  0  0  2         Iron/Ferritin No results for input(s): IRON in the last 72 hours. No lab exists for component: TIBCCALC   PTH/VIT D No results for input(s): PTH in the last 72 hours.     No lab exists for component: VITD           Mimi Trent M.D  [facility]  Office [phone]  Pager [phone]    July 5, 2017

## 2017-07-05 NOTE — ROUTINE PROCESS
Bedside and Verbal shift change report given to [employer] (oncoming nurse) by Heaven Cardenas RN (offgoing nurse). Report included the following information SBAR, Kardex, Intake/Output and MAR.

## 2017-07-05 NOTE — PROGRESS NOTES
1948 Bedside and Verbal shift change report given to Bard Winters (oncoming nurse) by Eli Cox RN (offgoing nurse). Report included the following information SBAR, Kardex, MAR and Recent Results. 2043 shift assessment done,,due medications given,patient complaining of pain for staying too long in bed ,md notified        0004 shift reassessment done,no changes in previous assessment      0401 Shift reassessment done,no signs of distress noted      0521 patient complaining of pain in his leg,wants to get up to stretch md notified of pain,prn tylenol given     0810 Bedside and Verbal shift change report given to Nitish Maciel RN (oncoming nurse) by  RN (offgoing nurse).  Report included the following information SBAR, Kardex, [facility] and Recent Results

## 2017-07-05 NOTE — PROGRESS NOTES
met with Patient completed the initial Spiritual Assessment of the patient, and offered Pastoral Care, see flow sheets for interventions. Patient does not have any [employer]ism/cultural needs that will affect patients preferences in health care. Charted reviewed. Chaplains will continue to follow and will provide pastoral care on an as needed/requested basis.       Dwayne, MPH,   [phone]

## 2017-07-06 LAB
ALBUMIN SERPL BCP-MCNC: 3.2 G/DL (ref 3.4–5)
ANION GAP BLD CALC-SCNC: 7 MMOL/L (ref 3–18)
BUN SERPL-MCNC: 12 MG/DL (ref 7–18)
BUN/CREAT SERPL: 7 (ref 12–20)
CALCIUM SERPL-MCNC: 7.9 MG/DL (ref 8.5–10.1)
CHLORIDE SERPL-SCNC: 113 MMOL/L (ref 100–108)
CK SERPL-CCNC: ABNORMAL U/L (ref 39–308)
CO2 SERPL-SCNC: 25 MMOL/L (ref 21–32)
CREAT SERPL-MCNC: 1.65 MG/DL (ref 0.6–1.3)
GLUCOSE SERPL-MCNC: 84 MG/DL (ref 74–99)
PHOSPHATE SERPL-MCNC: 3.7 MG/DL (ref 2.5–4.9)
POTASSIUM SERPL-SCNC: 4.1 MMOL/L (ref 3.5–5.5)
SODIUM SERPL-SCNC: 145 MMOL/L (ref 136–145)

## 2017-07-06 PROCEDURE — 74011250637 HC RX REV CODE- 250/637: Performed by: FAMILY MEDICINE

## 2017-07-06 PROCEDURE — 74011250637 HC RX REV CODE- 250/637: Performed by: HOSPITALIST

## 2017-07-06 PROCEDURE — 74011250636 HC RX REV CODE- 250/636: Performed by: HOSPITALIST

## 2017-07-06 PROCEDURE — 80069 RENAL FUNCTION PANEL: CPT | Performed by: INTERNAL MEDICINE

## 2017-07-06 PROCEDURE — 36415 COLL VENOUS BLD VENIPUNCTURE: CPT | Performed by: INTERNAL MEDICINE

## 2017-07-06 PROCEDURE — 74011250637 HC RX REV CODE- 250/637: Performed by: PSYCHIATRY & NEUROLOGY

## 2017-07-06 PROCEDURE — 77030020263 HC SOL INJ SOD CL0.9% LFCR 1000ML

## 2017-07-06 PROCEDURE — 82550 ASSAY OF CK (CPK): CPT | Performed by: INTERNAL MEDICINE

## 2017-07-06 PROCEDURE — 65660000000 HC RM CCU STEPDOWN

## 2017-07-06 RX ADMIN — LAMOTRIGINE 300 MG: 100 TABLET ORAL at 19:11

## 2017-07-06 RX ADMIN — SODIUM CHLORIDE 150 ML/HR: 900 INJECTION, SOLUTION INTRAVENOUS at 04:22

## 2017-07-06 RX ADMIN — LEVETIRACETAM 500 MG: 500 TABLET ORAL at 10:39

## 2017-07-06 RX ADMIN — SODIUM CHLORIDE 150 ML/HR: 900 INJECTION, SOLUTION INTRAVENOUS at 19:10

## 2017-07-06 RX ADMIN — SODIUM CHLORIDE 150 ML/HR: 900 INJECTION, SOLUTION INTRAVENOUS at 10:41

## 2017-07-06 RX ADMIN — ENOXAPARIN SODIUM 40 MG: 40 INJECTION SUBCUTANEOUS at 21:10

## 2017-07-06 RX ADMIN — CARBAMAZEPINE 400 MG: 200 TABLET, EXTENDED RELEASE ORAL at 10:39

## 2017-07-06 RX ADMIN — ACETAMINOPHEN 650 MG: 650 TABLET, FILM COATED, EXTENDED RELEASE ORAL at 10:39

## 2017-07-06 RX ADMIN — ACETAMINOPHEN 650 MG: 650 TABLET, FILM COATED, EXTENDED RELEASE ORAL at 22:47

## 2017-07-06 RX ADMIN — CARBAMAZEPINE 400 MG: 200 TABLET, EXTENDED RELEASE ORAL at 19:11

## 2017-07-06 RX ADMIN — LAMOTRIGINE 300 MG: 100 TABLET ORAL at 10:39

## 2017-07-06 RX ADMIN — LEVETIRACETAM 500 MG: 500 TABLET ORAL at 19:11

## 2017-07-06 RX ADMIN — SODIUM CHLORIDE 150 ML/HR: 900 INJECTION, SOLUTION INTRAVENOUS at 19:12

## 2017-07-06 NOTE — PROGRESS NOTES
In Patient Progress note      Admit Date: 7/3/2017      Assessment:   · MEENA. Etio- seizure disorder causing rhabdomyolysis. Non oliguric, renal function began to improve. · Rhabdomyolysis. , CKs continue to worsen. Unclear why? Patient also mentioned a         Previous episode. Possibility of a predisposition or enzyme error problem. I tried calling patients        Mom for more info , but she was unavailable , will try again later today. Continue iVF @ 150CC/hrs today , check CK and BMP  in AM  .    Seizures , now on his home meds. Recommendations:   · Continue NS at 150 ml/hour. Goal urine output ~ 2 lit/day. · Continue to monitor CPK, check in AM , I want to elicit more past history from mom. · Avoid NSAID's, IV dye. · Avoid fleets enemas due to concern for acute phosphate nephropathy. · Please dose all medications for approximate creatinine clearance  45-30. Thank you.  Please call with questions    Gretchen Aldridge MD    Subjective:     Complains of leg muscle pains         Current Facility-Administered Medications:     acetaminophen (TYLENOL) SR tablet 650 mg, 650 mg, Oral, TID PRN, Susana Claros MD, 650 mg at 07/05/17 1727    levETIRAcetam (KEPPRA) tablet 500 mg, 500 mg, Oral, BID, Parveen Szymanski MD, 500 mg at 07/05/17 1727    0.9% sodium chloride infusion, 150 mL/hr, IntraVENous, CONTINUOUS, Sabas Mahoney MD, Last Rate: 150 mL/hr at 07/06/17 0422, 150 mL/hr at 07/06/17 0422    carBAMazepine XR (TEGretol XR) tablet 400 mg, 400 mg, Oral, BID, Sabas Mahoney MD, 400 mg at 07/05/17 1727    enoxaparin (LOVENOX) injection 40 mg, 40 mg, SubCUTAneous, Q24H, Sabas Mahoney MD, 40 mg at 07/05/17 2138    lamoTRIgine (LaMICtal) tablet 300 mg, 300 mg, Oral, BID, Parveen Szymanski MD, 300 mg at 07/05/17 1727        Objective:     Visit Vitals    /70    Pulse 71    Temp 98.7 °F (37.1 °C)    Resp 18    Wt 78.9 kg (174 lb)    SpO2 100%    BMI 24.27 kg/m2         Intake/Output Summary (Last 24 hours) at 07/06/17 1033  Last data filed at 07/06/17 0941   Gross per 24 hour   Intake             3000 ml   Output             1880 ml   Net             1120 ml       Physical Exam:     gen : NAD  HENT dry mucosa  RS AEBE clear  CVS s1 s2 wnl  RS AEBE clear  GI soft BS +  Ext : no rashes        Data Review:    Recent Labs      07/04/17   0330   WBC  10.5   RBC  4.33*   HCT  36.9   MCV  85.2   MCH  28.2   MCHC  33.1   RDW  14.4     Recent Labs      07/06/17   0520  07/05/17   0505  07/04/17   0330  07/03/17   1418   BUN  12  13  20*  19*   CREA  1.65*  2.12*  2.44*  1.71*   CA  7.9*  8.0*  8.3*  8.5   ALB  3.2*  3.2*   --    --    K  4.1  3.6  3.8  3.9   NA  145  145  144  140   CL  113*  114*  112*  108   CO2  25  23  22  23   PHOS  3.7  3.1   --    --    GLU  84  96  103*  142*       Humberto Lipscomb MD

## 2017-07-06 NOTE — PROGRESS NOTES
Progress Note      Patient: Michelle Carbone               Sex: male          DOA: 7/3/2017       YOB: 1995      Age:  24 y.o.        LOS:  LOS: 3 days             CHIEF COMPLAINT:  Seizures with acute renal failure    Subjective:     Awake and alert  No further seizure    Objective:      Visit Vitals    /70    Pulse 71    Temp 98.7 °F (37.1 °C)    Resp 18    Wt 78.9 kg (174 lb)    SpO2 100%    BMI 24.27 kg/m2       Physical Exam:  Gen:  No distress, no complaint  Lungs:  Clear bilaterally, no wheeze or rhonchi  Heart:  Regular rate and rhythm, no murmurs or gallops  Abdomen:  Soft, non-tender, normal bowel sounds        Lab/Data Reviewed:  BMP:   Lab Results   Component Value Date/Time     07/06/2017 05:20 AM    K 4.1 07/06/2017 05:20 AM     (H) 07/06/2017 05:20 AM    CO2 25 07/06/2017 05:20 AM    AGAP 7 07/06/2017 05:20 AM    GLU 84 07/06/2017 05:20 AM    BUN 12 07/06/2017 05:20 AM    CREA 1.65 (H) 07/06/2017 05:20 AM    GFRAA >60 07/06/2017 05:20 AM    GFRNA 53 (L) 07/06/2017 05:20 AM           Assessment/Plan     Principal Problem:    Seizures ([address] (7/3/2017)    Active Problems:    Seizure disorder ([facility] (7/3/2017)    Acute renal failure  Rhabdomyolysis    Plan:  Continue with anticonvulsant  [place] [address]  CK recheck in am, if improving then dc.

## 2017-07-06 NOTE — MANAGEMENT PLAN
Spoke with patient and mother; plan remains to discharge to home.  Pt hopes to discharge tomorrow    Charly Alvarez RN BSN  Outcomes Manager  Pager # [phone]

## 2017-07-06 NOTE — PROGRESS NOTES
Bedside shift report received from Leonidas Das RN; family at bedside    1940: AOX3, on room air, resting in bed watching tv; NSR on tele, denies any pain or other discomforts; shift assessment done; bed alarms on; maintained on fall and seizure precautions    2100: resting in bed, calls for basic needs; no further complaints voiced; no seizure activity noted     2300: quietly sleeping; NSR on tele    0200: awake, watching tv; appears cheerful watching, denies any pain, answers questions asked    0400: sleeping comfortably    0630: awake, watching tv; no seizure activity noted throughout the night    Bedside and Verbal shift change report given to An Mcc[address] and Chan Alaniz RN (oncoming nurse) by Morris Celaya RN (offgoing nurse). Report included the following information SBAR, Kardex, Intake/Output, Recent Results and Cardiac Rhythm NSR.

## 2017-07-07 VITALS
WEIGHT: 174 LBS | SYSTOLIC BLOOD PRESSURE: 108 MMHG | TEMPERATURE: 99.3 F | BODY MASS INDEX: 24.27 KG/M2 | DIASTOLIC BLOOD PRESSURE: 64 MMHG | OXYGEN SATURATION: 100 % | RESPIRATION RATE: 16 BRPM | HEART RATE: 77 BPM

## 2017-07-07 LAB
ALBUMIN SERPL BCP-MCNC: 3.2 G/DL (ref 3.4–5)
ANION GAP BLD CALC-SCNC: 7 MMOL/L (ref 3–18)
BUN SERPL-MCNC: 6 MG/DL (ref 7–18)
BUN/CREAT SERPL: 5 (ref 12–20)
CALCIUM SERPL-MCNC: 7.5 MG/DL (ref 8.5–10.1)
CHLORIDE SERPL-SCNC: 107 MMOL/L (ref 100–108)
CK SERPL-CCNC: ABNORMAL U/L (ref 39–308)
CO2 SERPL-SCNC: 30 MMOL/L (ref 21–32)
CREAT SERPL-MCNC: 1.31 MG/DL (ref 0.6–1.3)
GLUCOSE SERPL-MCNC: 93 MG/DL (ref 74–99)
PHOSPHATE SERPL-MCNC: 3 MG/DL (ref 2.5–4.9)
POTASSIUM SERPL-SCNC: 3.6 MMOL/L (ref 3.5–5.5)
SODIUM SERPL-SCNC: 144 MMOL/L (ref 136–145)

## 2017-07-07 PROCEDURE — 80069 RENAL FUNCTION PANEL: CPT | Performed by: INTERNAL MEDICINE

## 2017-07-07 PROCEDURE — 74011250637 HC RX REV CODE- 250/637: Performed by: PSYCHIATRY & NEUROLOGY

## 2017-07-07 PROCEDURE — 74011250636 HC RX REV CODE- 250/636: Performed by: FAMILY MEDICINE

## 2017-07-07 PROCEDURE — 77030020263 HC SOL INJ SOD CL0.9% LFCR 1000ML

## 2017-07-07 PROCEDURE — 74011250636 HC RX REV CODE- 250/636: Performed by: HOSPITALIST

## 2017-07-07 PROCEDURE — 74011250637 HC RX REV CODE- 250/637: Performed by: HOSPITALIST

## 2017-07-07 PROCEDURE — 82550 ASSAY OF CK (CPK): CPT | Performed by: INTERNAL MEDICINE

## 2017-07-07 PROCEDURE — 36415 COLL VENOUS BLD VENIPUNCTURE: CPT | Performed by: INTERNAL MEDICINE

## 2017-07-07 RX ORDER — ONDANSETRON 2 MG/ML
INJECTION INTRAMUSCULAR; INTRAVENOUS
Status: DISCONTINUED
Start: 2017-07-07 | End: 2017-07-07 | Stop reason: HOSPADM

## 2017-07-07 RX ORDER — LAMOTRIGINE 150 MG/1
300 TABLET ORAL 2 TIMES DAILY
Qty: 120 TAB | Refills: 0 | Status: SHIPPED | OUTPATIENT
Start: 2017-07-07

## 2017-07-07 RX ORDER — ONDANSETRON 2 MG/ML
4 INJECTION INTRAMUSCULAR; INTRAVENOUS
Status: DISCONTINUED | OUTPATIENT
Start: 2017-07-07 | End: 2017-07-07 | Stop reason: HOSPADM

## 2017-07-07 RX ADMIN — ONDANSETRON 4 MG: 2 INJECTION INTRAMUSCULAR; INTRAVENOUS at 00:32

## 2017-07-07 RX ADMIN — LEVETIRACETAM 500 MG: 500 TABLET ORAL at 10:29

## 2017-07-07 RX ADMIN — SODIUM CHLORIDE 150 ML/HR: 900 INJECTION, SOLUTION INTRAVENOUS at 02:36

## 2017-07-07 RX ADMIN — LAMOTRIGINE 300 MG: 100 TABLET ORAL at 10:29

## 2017-07-07 RX ADMIN — CARBAMAZEPINE 400 MG: 200 TABLET, EXTENDED RELEASE ORAL at 10:29

## 2017-07-07 NOTE — ROUTINE PROCESS
Care Management Interventions  PCP Verified by CM: Yes (Dr Luciano Peterson)  Mode of Transport at Discharge:  Other (see comment) (mother)  Transition of Care Consult (CM Consult): Discharge Planning  Discharge Durable Medical Equipment: No  Physical Therapy Consult: No  Occupational Therapy Consult: No  Current Support Network: Relative's Home  Confirm Follow Up Transport: Family  Plan discussed with Pt/Family/Caregiver: Yes  Discharge Location  Discharge Placement: Home

## 2017-07-07 NOTE — DISCHARGE INSTRUCTIONS
Seizure: Care Instructions  Your Care Instructions    Seizures are caused by abnormal patterns of electrical signals in the brain. They are different for each person. Seizures can affect movement, speech, vision, or awareness. Some people have only slight shaking of a hand and do not pass out. Other people may pass out and have violent shaking of the whole body. Some people appear to stare into space. They are awake, but they can't respond normally. Later, they may not remember what happened. You may need tests to identify the type and cause of the seizures. A seizure may occur only once, or you may have them more than one time. Taking medicines as directed and following up with your doctor may help keep you from having more seizures. The doctor has checked you carefully, but problems can develop later. If you notice any problems or new symptoms, get medical treatment right away. Follow-up care is a key part of your treatment and safety. Be sure to make and go to all appointments, and call your doctor if you are having problems. It's also a good idea to know your test results and keep a list of the medicines you take. How can you care for yourself at home? · Be safe with medicines. Take your medicines exactly as prescribed. Call your doctor if you think you are having a problem with your medicine. · Do not do any activity that could be dangerous to you or others until your doctor says it is safe to do so. For example, do not drive a car, operate machinery, swim, or climb ladders. · Be sure that anyone treating you for any health problem knows that you have had a seizure and what medicines you are taking for it. · Identify and avoid things that may make you more likely to have a seizure. These may include lack of sleep, alcohol or drug use, stress, or not eating. · Make sure you go to your follow-up appointment. When should you call for help? Call 911 anytime you think you may need emergency care. For example, call if:  · You have another seizure. · You have more than one seizure in 24 hours. · You have new symptoms, such as trouble walking, speaking, or thinking clearly. Call your doctor now or seek immediate medical care if:  · You are not acting normally. Watch closely for changes in your health, and be sure to contact your doctor if you have any problems. Where can you learn more? Go to http://isac-tonny.info/. Enter A171 in the search box to learn more about \"Seizure: Care Instructions. \"  Current as of: October 14, 2016  Content Version: 11.3  © 5888-4276 [employer]. Care instructions adapted under license by [employer] (which disclaims liability or warranty for this information). If you have questions about a medical condition or this instruction, always ask your healthcare professional. [address] any warranty or liability for your use of this information. Epilepsy: Care Instructions  Your Care Instructions  Epilepsy is a common condition that causes repeated seizures. The seizures are caused by bursts of electrical activity in the brain that aren't normal. Seizures may cause problems with muscle control, movement, speech, vision, or awareness. They can be scary. Epilepsy affects each person differently. Some people have only a few seizures. Others get them more often. If you know what triggers a seizure, you may be able to avoid having one. You can take medicines to control and reduce seizures. You and your doctor will need to find the right combination, schedule, and dose of medicine. This may take time and careful changes. Seizures may get worse and happen more often over time. Follow-up care is a key part of your treatment and safety. Be sure to make and go to all appointments, and call your doctor if you are having problems.  It's also a good idea to know your test results and keep a list of the medicines you take. How can you care for yourself at home? · Be safe with medicines. Take your medicines exactly as prescribed. Call your doctor if you think you are having a problem with your medicine. · Make a treatment plan with your doctor. Be sure to follow your plan. · Try to identify and avoid things that may make you more likely to have a seizure. These may include:  ¨ Not getting enough sleep. ¨ Using drugs or alcohol. ¨ Being emotionally stressed. ¨ Skipping meals. · Keep a record of any seizures you have. Note the date, time of day, and any details about the seizure that you can remember. Your doctor can use this information to plan or adjust your medicine or other treatment. · Be sure that any doctor treating you for another condition knows that you have epilepsy. Each doctor should know what medicines you are taking, if any. · Wear a medical ID bracelet. You can buy this at most [employer]. If you have a seizure that leaves you unconscious or unable to speak for yourself, this bracelet will let those who are treating you know that you have epilepsy. · Talk to your doctor about whether it is safe for you to do certain activities, such as drive or swim. When should you call for help? Call 911 anytime you think you may need emergency care. For example, call if:  · A seizure does not stop as it normally does. · You have new symptoms such as:  ¨ Numbness, tingling, or weakness on one side of your body or face. ¨ Vision changes. ¨ Trouble speaking or thinking clearly. Call your doctor now or seek immediate medical care if:  · You have a fever. · You have a severe headache. Watch closely for changes in your health, and be sure to contact your doctor if:  · The normal pattern or features of your seizures change. Where can you learn more? Go to http://isac-tonny.info/. Delfino Rush in the search box to learn more about \"Epilepsy: Care Instructions. \"  Current as of: October 14, 2016  Content Version: 11.3  © 9809-4902 [employer]. Care instructions adapted under license by "[employer] (which disclaims liability or warranty for this information). If you have questions about a medical condition or this instruction, always ask your healthcare professional. [address] any warranty or liability for your use of this information. DISCHARGE SUMMARY from Nurse    The following personal items are in your possession at time of discharge:    Dental Appliances: None  Visual Aid: Glasses, With patient     Home Medications: None  Jewelry: Bracelet (4 rubber bracelets with patient)  Clothing: Sent home  Other Valuables: None             PATIENT INSTRUCTIONS:    After general anesthesia or intravenous sedation, for 24 hours or while taking prescription Narcotics:  · Limit your activities  · Do not drive and operate hazardous machinery  · Do not make important personal or business decisions  · Do  not drink alcoholic beverages  · If you have not urinated within 8 hours after discharge, please contact your surgeon on call. Report the following to your surgeon:  · Excessive pain, swelling, redness or odor of or around the surgical area  · Temperature over 100.5  · Nausea and vomiting lasting longer than 4 hours or if unable to take medications  · Any signs of decreased circulation or nerve impairment to extremity: change in color, persistent  numbness, tingling, coldness or increase pain  · Any questions        What to do at Home:  Recommended activity: Activity as tolerated. If you experience any of the following symptoms such as visual disturbances, please follow up with primary care provider. *  Please give a list of your current medications to your Primary Care Provider. *  Please update this list whenever your medications are discontinued, doses are      changed, or new medications (including over-the-counter products) are added.     * Please carry medication information at all times in case of emergency situations. These are general instructions for a healthy lifestyle:    No smoking/ No tobacco products/ Avoid exposure to second hand smoke    Surgeon General's Warning:  Quitting smoking now greatly reduces serious risk to your health. Obesity, smoking, and sedentary lifestyle greatly increases your risk for illness    A healthy diet, regular physical exercise & weight monitoring are important for maintaining a healthy lifestyle    You may be retaining fluid if you have a history of heart failure or if you experience any of the following symptoms:  Weight gain of 3 pounds or more overnight or 5 pounds in a week, increased swelling in our hands or feet or shortness of breath while lying flat in bed. Please call your doctor as soon as you notice any of these symptoms; do not wait until your next office visit. Recognize signs and symptoms of STROKE:    F-face looks uneven    A-arms unable to move or move unevenly    S-speech slurred or non-existent    T-time-call 911 as soon as signs and symptoms begin-DO NOT go       Back to bed or wait to see if you get better-TIME IS BRAIN. Warning Signs of HEART ATTACK     Call 911 if you have these symptoms:   Chest discomfort. Most heart attacks involve discomfort in the center of the chest that lasts more than a few minutes, or that goes away and comes back. It can feel like uncomfortable pressure, squeezing, fullness, or pain.  Discomfort in other areas of the upper body. Symptoms can include pain or discomfort in one or both arms, the back, neck, jaw, or stomach.  Shortness of breath with or without chest discomfort.  Other signs may include breaking out in a cold sweat, nausea, or lightheadedness. Don't wait more than five minutes to call 911 - MINUTES MATTER! Fast action can save your life. Calling 911 is almost always the fastest way to get lifesaving treatment.  Emergency Medical Services staff can begin treatment when they arrive -- up to an hour sooner than if someone gets to the hospital by car. The discharge information has been reviewed with the patient. The patient verbalized understanding. Discharge medications reviewed with the patient and appropriate educational materials and side effects teaching were provided. [employer] Activation    Thank you for requesting access to [employer]. Please follow the instructions below to securely access and download your online medical record. [employer] allows you to send messages to your doctor, view your test results, renew your prescriptions, schedule appointments, and more. How Do I Sign Up? 1. In your internet browser, go to [email or web address]  2. Click on the First Time User? Click Here link in the Sign In box. You will be redirect to the New Member Sign Up page. 3. Enter your [employer] Access Code exactly as it appears below. You will not need to use this code after youve completed the sign-up process. If you do not sign up before the expiration date, you must request a new code. [employer] Access Code: Activation code not generated  Current [employer] Status: Active (This is the date your [employer] access code will )    4. Enter the last four digits of your Social Security Number (xxxx) and Date of Birth (mm/dd/yyyy) as indicated and click Submit. You will be taken to the next sign-up page. 5. Create a [employer] ID. This will be your [employer] login ID and cannot be changed, so think of one that is secure and easy to remember. 6. Create a [employer] password. You can change your password at any time. 7. Enter your Password Reset Question and Answer. This can be used at a later time if you forget your password. 8. Enter your e-mail address. You will receive e-mail notification when new information is available in [address] 9. Click Sign Up. You can now view and download portions of your medical record.   10. Click the [facility] link to download a portable copy of your medical information. Additional Information    If you have questions, please visit the Frequently Asked Questions section of the [employer] website at [email or web address] Remember, [employer] is NOT to be used for urgent needs. For medical emergencies, dial 911.       Patient armband removed and shredded

## 2017-07-07 NOTE — PROGRESS NOTES
Bedside shift report received from Daniel Moody: Alert, oriented X 3, resting in bed family at bedside; watching tv; IV heplock on left AC removed for c/o pain and redness over IV site; new g 22 IV heplock  inserted on right forearm; IVF infusing well; family at bedside    2030: resting in bed watching tv with family    2110: due Lovenox given as scheduled    2247: c/o 4/10 headache over frontal area; Tylenol 650 mg given po; will further assess pt    2330: verbalized relief from headache; assisted with further needs; urinal emptied    0032: c/o nausea and seeing black dots moving in circles in front of eyes; Dr. Raiza Ojeda informed of pt complaints; order for Zofran obtained and given to pt; observed pt closely for any seizure activity;  Kept on strict seizure precautions    0130: sleeping comfortably; NSR on tele    0236: emptied urinal; sleeping comfortably; no seizure activity noted     0530: awake, sitting in bed watching tv; denies any headache, nausea or other discomforts; appears comfortable    0645: resting in bed; no seizure activity noted throughout the night    0715: Bedside and Verbal shift change report given to [facility] and Coty Jane RN (oncoming nurse) by Bj Ayala RN (offgoing nurse). Report included the following information SBAR, Kardex, Recent Results and Cardiac Rhythm NSR.

## 2017-07-07 NOTE — PROGRESS NOTES
0800-assessment complete. No signs of distress. 1200-no signs of distress. 1600-no signs of distress. Bedside shift change report given to PARK Lamb (oncoming nurse) by Daniel Jara (offgoing nurse). Report included the following information SBAR.

## 2017-07-10 ENCOUNTER — PATIENT OUTREACH (OUTPATIENT)
Dept: FAMILY MEDICINE CLINIC | Facility: CLINIC | Age: 22
End: 2017-07-10

## 2017-07-10 LAB — LEVETIRACETAM SERPL-MCNC: NORMAL UG/ML (ref 10–40)

## 2017-07-10 NOTE — PROGRESS NOTES
Patient was admitted to [facility] on 7/3/17-7/7/17 for Seizures. Patient discharged to home on 7/7/17. Attempt to contact patient via telephone on 7/10/17 for post hospital follow up. Unable to reach. Unable to leave a voice message. Phone number is invalid.      Noted patient has an upcoming appointment scheduled with Dr. Ling Antoine on 7/17/2017 3:30 PM.

## 2017-07-11 ENCOUNTER — PATIENT OUTREACH (OUTPATIENT)
Dept: FAMILY MEDICINE CLINIC | Facility: CLINIC | Age: 22
End: 2017-07-11

## 2017-07-11 NOTE — PROGRESS NOTES
Patient was admitted to [facility] on 7/3/17-7/7/17 for Seizures. Patient discharged to home on 7/7/17. Attempt to contact patient via telephone on 7/11/17 for post hospital follow up. Unable to reach. Unable to leave a voice message. Phone number is invalid. Noted patient has an upcoming appointment scheduled with Dr. Lulú Barker on 7/17/2017 3:30 PM.     Will send letter if no show on 7/17/17 .

## 2017-07-17 ENCOUNTER — PATIENT OUTREACH (OUTPATIENT)
Dept: FAMILY MEDICINE CLINIC | Facility: CLINIC | Age: 22
End: 2017-07-17

## 2017-07-17 ENCOUNTER — HOSPITAL ENCOUNTER (OUTPATIENT)
Dept: LAB | Age: 22
Discharge: HOME | End: 2017-07-17

## 2017-07-17 ENCOUNTER — OFFICE VISIT (OUTPATIENT)
Dept: FAMILY MEDICINE CLINIC | Facility: CLINIC | Age: 22
End: 2017-07-17

## 2017-07-17 VITALS
BODY MASS INDEX: 23.35 KG/M2 | OXYGEN SATURATION: 97 % | HEIGHT: 71 IN | HEART RATE: 82 BPM | WEIGHT: 166.8 LBS | TEMPERATURE: 99.3 F | SYSTOLIC BLOOD PRESSURE: 110 MMHG | DIASTOLIC BLOOD PRESSURE: 58 MMHG | RESPIRATION RATE: 14 BRPM

## 2017-07-17 DIAGNOSIS — Z79.899 LONG-TERM USE OF HIGH-RISK MEDICATION: ICD-10-CM

## 2017-07-17 DIAGNOSIS — R56.9 SEIZURES (HCC): Primary | ICD-10-CM

## 2017-07-17 DIAGNOSIS — M62.82 NON-TRAUMATIC RHABDOMYOLYSIS: ICD-10-CM

## 2017-07-17 PROCEDURE — 99001 SPECIMEN HANDLING PT-LAB: CPT | Performed by: FAMILY MEDICINE

## 2017-07-17 NOTE — PROGRESS NOTES
SUBJECTIVE:  Rosmery Falk is a 24y.o. year old male   Chief Complaint   Patient presents with    Transitions Of Care       History of Present Illness:   Patient was admitted to [facility] on 7/3/17-7/7/17 for Seizures, rhabdomyolysis and ARF. His mother was out of the country for 2 weeks and apparently he was not taking his medications regularly. His medications have been adjusted. He has f/u with neurology. Since discharge to home, he has not had any further seizures. He is back to his base line. This is his transitional care. Our nurse navigator attempted to contact patient via telephone on 7/10 and 7/11/17 for post hospital follow up. Unable to reach. Unable to leave a voice message. Phone number is invalid. 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 No pertinent surgical history. Current Outpatient Prescriptions   Medication Sig    lamoTRIgine (LAMICTAL) 150 mg tablet Take 2 Tabs by mouth two (2) times a day.  carBAMazepine XR (TEGRETOL XR) 400 mg SR tablet Take 1 Tab by mouth two (2) times a day.  levETIRAcetam (KEPPRA) 500 mg tablet Take 1 Tab by mouth two (2) times a day. No current facility-administered medications for this visit.         No Known Allergies     Family History   Problem Relation Age of Onset    No Known Problems Mother     No Known Problems Sister     No Known Problems Brother     Cancer Maternal Uncle     Seizures Maternal Uncle     Seizures Paternal Uncle     Cancer Maternal Grandmother     Diabetes Maternal Grandfather     High Cholesterol Maternal Grandfather     Seizures Other      Paternal Cousin, male    No Known Problems Sister     Cancer Other         Social History   Substance Use Topics    Smoking status: Never Smoker    Smokeless tobacco: Never Used    Alcohol use No       Review of Systems:   Constitutional: No fever, chills, night sweats, malaise, dizziness. Eyes: Denies any complaints. Cardiovascular: No angina, palpitations, PND, orthopnea, lightheadedness, edema, claudication. Respiratory: No dyspnea, wheeze, pleurisy, hemoptysis, unusual cough or sputum. Gastrointestinal: No nausea/ vomiting, bowel habit change, pain, CHRISSIE symptoms, melena, hematochezia, anorexia. Musculoskeletal: No joint swelling/pain, instability, focal weakness, stiffness/rigidity, radicular pain. Skin: Denies any complaints. Neurological: No numbness, dizziness, acute speech abnormality, incontinence. Psychiatric: No agitation, confusion/disorientation, suicidal or homicidal ideation. OBJECTIVE:  Physical Exam:   Constitutional: General Appearance:  well developed, well nourished, nontoxic, in no acute distress. Visit Vitals    /58 (BP 1 Location: Left arm, BP Patient Position: Sitting)    Pulse 82    Temp 99.3 °F (37.4 °C) (Oral)    Resp 14    Ht 5' 11\" (1.803 m)    Wt 166 lb 12.8 oz (75.7 kg)    SpO2 97%    BMI 23.26 kg/m2     Eyes: Conjunctiva: normal & Lids: normal.  Pupils & Irises: normal size; equal, round and reactive to light. Neck Area: Neck: without masses, symmetric, trachea is midline. Thyroid:  without significant enlargement, masses or tenderness. Pulmonary: Respiratory effort: normal; no dyspnea, no retractions, no accessory muscle use. Auscultation: normal & symmetrical air exchange; no rales, no rhonchi, no wheeze; no rubs    Cardiovascular: Palpation: PMI not displaced or enlarged, no thrills or heaves. Auscultation: RRR; no murmur, rubs or gallops. Extremities: no edema, no active varicosity. Gastrointestinal: Normal bowel sounds. No masses; no tenderness; no rebound/rigidity; no CVA tenderness. No hepatosplenomegaly. Skin: Inspection: no significant rashes. Nodes: Cervical: no significant adenopathy.   Inguinal: no significant adenopathy. Psychiatric: Pleasant and cooperative. Neurologic: CN I to XII: intact. DTR: symmetrical & normal.    Musculoskeletal: NC/AT. Neck-supple. Gait and Station: gait- normal; station- normal.    Lab Results   Component Value Date/Time    WBC 10.5 07/04/2017 03:30 AM    HGB 12.2 07/04/2017 03:30 AM    HCT 36.9 07/04/2017 03:30 AM    PLATELET 405 25/93/1777 03:30 AM    MCV 85.2 07/04/2017 03:30 AM     Lab Results   Component Value Date/Time    Sodium 144 07/07/2017 08:00 AM    Potassium 3.6 07/07/2017 08:00 AM    Chloride 107 07/07/2017 08:00 AM    CO2 30 07/07/2017 08:00 AM    Anion gap 7 07/07/2017 08:00 AM    Glucose 93 07/07/2017 08:00 AM    BUN 6 07/07/2017 08:00 AM    Creatinine 1.31 07/07/2017 08:00 AM    BUN/Creatinine ratio 5 07/07/2017 08:00 AM    GFR est AA >60 07/07/2017 08:00 AM    GFR est non-AA >60 07/07/2017 08:00 AM    Calcium 7.5 07/07/2017 08:00 AM    Bilirubin, total 0.1 12/31/2015 11:58 AM    AST (SGOT) 20 12/31/2015 11:58 AM    Alk. phosphatase 73 12/31/2015 11:58 AM    Protein, total 6.9 12/31/2015 11:58 AM    Albumin 3.2 07/07/2017 08:00 AM    Globulin 2.9 12/31/2015 11:58 AM    A-G Ratio 1.4 12/31/2015 11:58 AM    ALT (SGPT) 39 12/31/2015 11:58 AM     Lab Results   Component Value Date/Time    CK 62887 07/07/2017 08:00 AM       ASSESSMENT:     1. Seizures ([address]    2. Long-term use of high-risk medication    3. Non-traumatic rhabdomyolysis        PLAN:     Pharmacologic Management: Medications reviewed with the patient. Take medications regularly. Orders Placed This Encounter    MAGNESIUM    METABOLIC PANEL, COMPREHENSIVE    CK    CARBAMAZEPINE    CBC WITH AUTOMATED DIFF    TSH 3RD GENERATION    URINALYSIS W/ RFLX MICROSCOPIC      F/U with neurology. Discussed DDx, follow-up & work-up. Discussed risk/benefit & side effect of treatment. Follow up visit as planned, prn sooner. Seizure precautions. Health risk from non adherence discussed.   Mother/Patient voiced understanding. Follow-up Disposition:  Return in about 2 months (around 9/17/2017).     Raciel Crockett MD

## 2017-07-17 NOTE — PROGRESS NOTES
Patient was admitted to [facility] on 7/3/17-7/7/17 for Seizures. Patient discharged to home on 7/7/17. I met the patient and patient's mother in  the office today 7/17/17. Patient's states that He is doing good. Patient looks well. No S/S of acute distress noted on patient at this time. No concerns, assistance and/or questions verbalized by patient  at this time. Office contact information was provided for future reference, assistance, concerns, or further questions. New patient's  phone number [phone].

## 2017-07-17 NOTE — PROGRESS NOTES
Ignacio Carballo is a 24 y.o.  male presents today for office visit for transitional care. Pt is in Room # 6. This patient is accompanied in the office by his mother. 1. Have you been to the ER, urgent care clinic since your last visit? Hospitalized since your last visit? Yes [facility] on 7/3/17-7/7/17 for Seizures. 2. Have you seen or consulted any other health care providers outside of the [address] [place] [address] since your last visit? Include any pap smears or colon screening.  No    Upcoming Appts  7/22/17 - Dr. Fabian Olivares, Neurology

## 2017-07-18 LAB
ALBUMIN SERPL-MCNC: 4.3 G/DL (ref 3.5–5.5)
ALBUMIN/GLOB SERPL: 1.7 {RATIO} (ref 1.2–2.2)
ALP SERPL-CCNC: 58 IU/L (ref 39–117)
ALT SERPL-CCNC: 45 IU/L (ref 0–44)
APPEARANCE UR: CLEAR
AST SERPL-CCNC: 20 IU/L (ref 0–40)
BASOPHILS # BLD AUTO: 0 X10E3/UL (ref 0–0.2)
BASOPHILS NFR BLD AUTO: 0 %
BILIRUB SERPL-MCNC: <0.2 MG/DL (ref 0–1.2)
BILIRUB UR QL STRIP: NEGATIVE
BUN SERPL-MCNC: 13 MG/DL (ref 6–20)
BUN/CREAT SERPL: 13 (ref 9–20)
CALCIUM SERPL-MCNC: 9.1 MG/DL (ref 8.7–10.2)
CARBAMAZEPINE SERPL-MCNC: 8.3 UG/ML (ref 4–12)
CHLORIDE SERPL-SCNC: 101 MMOL/L (ref 96–106)
CK SERPL-CCNC: 228 U/L (ref 24–204)
CO2 SERPL-SCNC: 27 MMOL/L (ref 18–29)
COLOR UR: YELLOW
CREAT SERPL-MCNC: 0.99 MG/DL (ref 0.76–1.27)
EOSINOPHIL # BLD AUTO: 0.1 X10E3/UL (ref 0–0.4)
EOSINOPHIL NFR BLD AUTO: 2 %
ERYTHROCYTE [DISTWIDTH] IN BLOOD BY AUTOMATED COUNT: 14.6 % (ref 12.3–15.4)
GLOBULIN SER CALC-MCNC: 2.5 G/DL (ref 1.5–4.5)
GLUCOSE SERPL-MCNC: 77 MG/DL (ref 65–99)
GLUCOSE UR QL: NEGATIVE
HCT VFR BLD AUTO: 36.9 % (ref 37.5–51)
HGB BLD-MCNC: 12.1 G/DL (ref 12.6–17.7)
HGB UR QL STRIP: NEGATIVE
IMM GRANULOCYTES # BLD: 0 X10E3/UL (ref 0–0.1)
IMM GRANULOCYTES NFR BLD: 0 %
KETONES UR QL STRIP: NEGATIVE
LEUKOCYTE ESTERASE UR QL STRIP: NEGATIVE
LYMPHOCYTES # BLD AUTO: 1.7 X10E3/UL (ref 0.7–3.1)
LYMPHOCYTES NFR BLD AUTO: 28 %
MAGNESIUM SERPL-MCNC: 2.1 MG/DL (ref 1.6–2.3)
MCH RBC QN AUTO: 27.9 PG (ref 26.6–33)
MCHC RBC AUTO-ENTMCNC: 32.8 G/DL (ref 31.5–35.7)
MCV RBC AUTO: 85 FL (ref 79–97)
MICRO URNS: NORMAL
MONOCYTES # BLD AUTO: 0.4 X10E3/UL (ref 0.1–0.9)
MONOCYTES NFR BLD AUTO: 7 %
NEUTROPHILS # BLD AUTO: 3.8 X10E3/UL (ref 1.4–7)
NEUTROPHILS NFR BLD AUTO: 63 %
NITRITE UR QL STRIP: NEGATIVE
PH UR STRIP: 6 [PH] (ref 5–7.5)
PLATELET # BLD AUTO: 294 X10E3/UL (ref 150–379)
POTASSIUM SERPL-SCNC: 4.4 MMOL/L (ref 3.5–5.2)
PROT SERPL-MCNC: 6.8 G/DL (ref 6–8.5)
PROT UR QL STRIP: NEGATIVE
RBC # BLD AUTO: 4.33 X10E6/UL (ref 4.14–5.8)
SODIUM SERPL-SCNC: 143 MMOL/L (ref 134–144)
SP GR UR: 1.02 (ref 1–1.03)
TSH SERPL DL<=0.005 MIU/L-ACNC: 1.22 UIU/ML (ref 0.45–4.5)
UROBILINOGEN UR STRIP-MCNC: 0.2 MG/DL (ref 0.2–1)
WBC # BLD AUTO: 6 X10E3/UL (ref 3.4–10.8)

## 2017-07-18 NOTE — PROGRESS NOTES
CK (muscle) and creatinine (kidney) were much better. Other blood work came back stable. Will need follow up.

## 2017-07-20 NOTE — DISCHARGE SUMMARY
[facility] SUMMARY    Name:  Cordelia Verdin  MR#:  [id number]  :  1995  Account #:  [de-identified]  Date of Adm:  2017  Date of Discharge:  2017      ADMITTING DIAGNOSIS:  Seizures. HISTORY OF PRESENT ILLNESS:  The patient is a 80-year-old   male who presented to the emergency room with seizures. He has had a long-standing history of seizures. He required a great  deal of medication to break his seizure activity. In the emergency  room, his drug levels in his blood were found to be significantly  decreased and he was admitted for ongoing management. HOSPITAL COURSE:  Neurology consultation was obtained and his  medications were adjusted. The patient's mother reported that she had  been out of town. It appears that he had not been taking his medicines  significantly. His medications were adjusted in the hospital.  Nephrology was also consulted because of acute renal failure. He had  some rhabdomyolysis which was also thought to be secondary to his  seizures. He was hydrated significantly and his renal function  improved. His CPK began to come down and for several days the  patient's mother commented that the patient seemed very stable. He  appeared to be at his baseline completely. We waited for 48 hours  after he normalized for his renal function and CPK to improve. These  were both improving and it was felt that at discharge, his CPK was  significantly elevated mostly because of his seizures. He is  discharging home and he will follow up with his primary care provider. He will have a CPK rechecked at that time. Again, he feels perfectly  normal and his mother is strongly asking for discharge. DISCHARGE DIAGNOSIS:  1. Recurrent seizures, improved. 2.  Acute renal failure, improved. 3.  Rhabdomyolysis, improved. CONDITION:  Improved. ACTIVITY:  Ad chuck.     FOLLOWUP:  With his primary care provider in 1 week, patient will  arrange. MEDICATIONS AT THE TIME OF DISCHARGE:  1.  Lamictal 300 mg by mouth 2 times daily. 2   Tegretol 400 mg by mouth 2 times daily. 3.  Keppra 500 mg by mouth 2 times daily. DIET:  Regular.         MD Gregorio Bojorquez 30 / Tiara Reyna  D:  07/17/2017   11:07  T:  07/19/2017   22:21  Job #:  [id number]

## 2017-08-02 ENCOUNTER — PATIENT OUTREACH (OUTPATIENT)
Dept: FAMILY MEDICINE CLINIC | Facility: CLINIC | Age: 22
End: 2017-08-02

## 2017-08-02 NOTE — PROGRESS NOTES
Patient was admitted to [facility] on 7/3/17-7/7/17 for Seizures. Patient discharged to home on 7/7/17. Attempt to contact patient via telephone on 8/2/17 for post hospital follow up. Reached Patient's mother on phone. Patient's mother verified patient's identity using his full name and date of birth. Patient's mother stated \"He is doing okay. \"  Patient's mother states that patient seen by his  neurologist and did some medication adjustment. No further questions, concerns, needs and/or assistance at this time as per patient's mother. Patient's mother kept the conversation short. Patient mother thanked the office for f/u phone calls. Patient mother ended the call.

## 2017-08-11 ENCOUNTER — PATIENT OUTREACH (OUTPATIENT)
Dept: FAMILY MEDICINE CLINIC | Facility: CLINIC | Age: 22
End: 2017-08-11

## 2017-08-11 NOTE — PROGRESS NOTES
Patient was admitted to [facility] on 7/3/17-7/7/17 for Seizures. Patient discharged to home on 7/7/17. Last f/u Appointment with Dr. Gladis Fuentes on 7/17/17. Noted no hospitalization  admission post 30 days from discharge date 7/7/17. This episode is closed. Post Hospitalization Encounter Resolved.

## 2017-10-05 ENCOUNTER — TELEPHONE (OUTPATIENT)
Dept: FAMILY MEDICINE CLINIC | Facility: CLINIC | Age: 22
End: 2017-10-05

## 2017-10-05 NOTE — TELEPHONE ENCOUNTER
Patients mother called and would like for someone to call her back with the results of her son's labs from

## 2017-10-06 NOTE — TELEPHONE ENCOUNTER
Spoke with pt's mother in regards to lab results. Two pt identifier's and permission to release verified. Relayed 's notes. Pt's mother acknowledges understanding and pt's mother states she will call back  to schedule a follow up appt. Pt's mother voices no concerns at this time.

## 2017-11-23 ENCOUNTER — HOSPITAL ENCOUNTER (EMERGENCY)
Age: 22
Discharge: HOME | End: 2017-11-23
Attending: EMERGENCY MEDICINE | Admitting: EMERGENCY MEDICINE
Payer: MEDICAID

## 2017-11-23 VITALS
DIASTOLIC BLOOD PRESSURE: 57 MMHG | RESPIRATION RATE: 18 BRPM | HEART RATE: 115 BPM | OXYGEN SATURATION: 100 % | SYSTOLIC BLOOD PRESSURE: 128 MMHG | TEMPERATURE: 98 F

## 2017-11-23 DIAGNOSIS — Z87.898 HISTORY OF SEIZURE: ICD-10-CM

## 2017-11-23 DIAGNOSIS — R11.2 NAUSEA AND VOMITING IN ADULT PATIENT: Primary | ICD-10-CM

## 2017-11-23 LAB
ANION GAP SERPL CALC-SCNC: 9 MMOL/L (ref 3–18)
APPEARANCE UR: CLEAR
BASOPHILS # BLD: 0 K/UL (ref 0–0.06)
BASOPHILS NFR BLD: 0 % (ref 0–2)
BILIRUB UR QL: NEGATIVE
BUN SERPL-MCNC: 13 MG/DL (ref 7–18)
BUN/CREAT SERPL: 13 (ref 12–20)
CALCIUM SERPL-MCNC: 9.2 MG/DL (ref 8.5–10.1)
CHLORIDE SERPL-SCNC: 105 MMOL/L (ref 100–108)
CO2 SERPL-SCNC: 25 MMOL/L (ref 21–32)
COLOR UR: YELLOW
CREAT SERPL-MCNC: 0.99 MG/DL (ref 0.6–1.3)
DIFFERENTIAL METHOD BLD: NORMAL
EOSINOPHIL # BLD: 0.1 K/UL (ref 0–0.4)
EOSINOPHIL NFR BLD: 1 % (ref 0–5)
ERYTHROCYTE [DISTWIDTH] IN BLOOD BY AUTOMATED COUNT: 13.2 % (ref 11.6–14.5)
GLUCOSE SERPL-MCNC: 109 MG/DL (ref 74–99)
GLUCOSE UR STRIP.AUTO-MCNC: NEGATIVE MG/DL
HCT VFR BLD AUTO: 41.5 % (ref 36–48)
HGB BLD-MCNC: 14.2 G/DL (ref 13–16)
HGB UR QL STRIP: NEGATIVE
KETONES UR QL STRIP.AUTO: NEGATIVE MG/DL
LEUKOCYTE ESTERASE UR QL STRIP.AUTO: NEGATIVE
LYMPHOCYTES # BLD: 2.4 K/UL (ref 0.9–3.6)
LYMPHOCYTES NFR BLD: 31 % (ref 21–52)
MCH RBC QN AUTO: 28.6 PG (ref 24–34)
MCHC RBC AUTO-ENTMCNC: 34.2 G/DL (ref 31–37)
MCV RBC AUTO: 83.7 FL (ref 74–97)
MONOCYTES # BLD: 0.6 K/UL (ref 0.05–1.2)
MONOCYTES NFR BLD: 8 % (ref 3–10)
NEUTS SEG # BLD: 4.5 K/UL (ref 1.8–8)
NEUTS SEG NFR BLD: 60 % (ref 40–73)
NITRITE UR QL STRIP.AUTO: NEGATIVE
PH UR STRIP: 6.5 [PH] (ref 5–8)
PLATELET # BLD AUTO: 227 K/UL (ref 135–420)
PMV BLD AUTO: 9.3 FL (ref 9.2–11.8)
POTASSIUM SERPL-SCNC: 3.8 MMOL/L (ref 3.5–5.5)
PROT UR STRIP-MCNC: NEGATIVE MG/DL
RBC # BLD AUTO: 4.96 M/UL (ref 4.7–5.5)
SODIUM SERPL-SCNC: 139 MMOL/L (ref 136–145)
SP GR UR REFRACTOMETRY: 1.02 (ref 1–1.03)
UROBILINOGEN UR QL STRIP.AUTO: 0.2 EU/DL (ref 0.2–1)
WBC # BLD AUTO: 7.5 K/UL (ref 4.6–13.2)

## 2017-11-23 PROCEDURE — 85025 COMPLETE CBC W/AUTO DIFF WBC: CPT | Performed by: NURSE PRACTITIONER

## 2017-11-23 PROCEDURE — 81003 URINALYSIS AUTO W/O SCOPE: CPT | Performed by: NURSE PRACTITIONER

## 2017-11-23 PROCEDURE — 96374 THER/PROPH/DIAG INJ IV PUSH: CPT

## 2017-11-23 PROCEDURE — 99284 EMERGENCY DEPT VISIT MOD MDM: CPT

## 2017-11-23 PROCEDURE — 74011250636 HC RX REV CODE- 250/636: Performed by: NURSE PRACTITIONER

## 2017-11-23 PROCEDURE — 80048 BASIC METABOLIC PNL TOTAL CA: CPT | Performed by: NURSE PRACTITIONER

## 2017-11-23 RX ORDER — ONDANSETRON 2 MG/ML
4 INJECTION INTRAMUSCULAR; INTRAVENOUS
Status: COMPLETED | OUTPATIENT
Start: 2017-11-23 | End: 2017-11-23

## 2017-11-23 RX ORDER — ONDANSETRON 4 MG/1
4 TABLET, ORALLY DISINTEGRATING ORAL
Qty: 10 TAB | Refills: 0 | Status: SHIPPED | OUTPATIENT
Start: 2017-11-23 | End: 2018-10-31

## 2017-11-23 RX ADMIN — ONDANSETRON 4 MG: 2 INJECTION INTRAMUSCULAR; INTRAVENOUS at 01:55

## 2017-11-23 RX ADMIN — SODIUM CHLORIDE 1000 ML: 900 INJECTION, SOLUTION INTRAVENOUS at 01:55

## 2017-11-23 NOTE — DISCHARGE INSTRUCTIONS
Nausea and Vomiting: Care Instructions  Your Care Instructions    When you are nauseated, you may feel weak and sweaty and notice a lot of saliva in your mouth. Nausea often leads to vomiting. Most of the time you do not need to worry about nausea and vomiting, but they can be signs of other illnesses. Two common causes of nausea and vomiting are stomach flu and food poisoning. Nausea and vomiting from viral stomach flu will usually start to improve within 24 hours. Nausea and vomiting from food poisoning may last from 12 to 48 hours. The doctor has checked you carefully, but problems can develop later. If you notice any problems or new symptoms, get medical treatment right away. Follow-up care is a key part of your treatment and safety. Be sure to make and go to all appointments, and call your doctor if you are having problems. It's also a good idea to know your test results and keep a list of the medicines you take. How can you care for yourself at home? · To prevent dehydration, drink plenty of fluids, enough so that your urine is light yellow or clear like water. Choose water and other caffeine-free clear liquids until you feel better. If you have kidney, heart, or liver disease and have to limit fluids, talk with your doctor before you increase the amount of fluids you drink. · Rest in bed until you feel better. · When you are able to eat, try clear soups, mild foods, and liquids until all symptoms are gone for 12 to 48 hours. Other good choices include dry toast, crackers, cooked cereal, and gelatin dessert, such as Jell-O. When should you call for help? Call 911 anytime you think you may need emergency care. For example, call if:  ? · You passed out (lost consciousness). ?Call your doctor now or seek immediate medical care if:  ? · You have symptoms of dehydration, such as:  ¨ Dry eyes and a dry mouth. ¨ Passing only a little dark urine.   ¨ Feeling thirstier than usual.   ? · You have new or worsening belly pain. ? · You have a new or higher fever. ? · You vomit blood or what looks like coffee grounds. ? Watch closely for changes in your health, and be sure to contact your doctor if:  ? · You have ongoing nausea and vomiting. ? · Your vomiting is getting worse. ? · Your vomiting lasts longer than 2 days. ? · You are not getting better as expected. Where can you learn more? Go to http://isac-tonny.info/. Enter 25 [id number] in the search box to learn more about \"Nausea and Vomiting: Care Instructions. \"  Current as of: 2017  Content Version: 11.4  © 6892-3279 [employer]. Care instructions adapted under license by [employer] (which disclaims liability or warranty for this information). If you have questions about a medical condition or this instruction, always ask your healthcare professional. [place] [address] any warranty or liability for your use of this information. REES46 Activation    Thank you for requesting access to REES46. Please follow the instructions below to securely access and download your online medical record. REES46 allows you to send messages to your doctor, view your test results, renew your prescriptions, schedule appointments, and more. How Do I Sign Up? 1. In your internet browser, go to [email or web address]  2. Click on the First Time User? Click Here link in the Sign In box. You will be redirect to the New Member Sign Up page. 3. Enter your [email or web address] Access Code exactly as it appears below. You will not need to use this code after youve completed the sign-up process. If you do not sign up before the expiration date, you must request a new code. [email or web address] Access Code: [email or web address]  Expires: 2018  3:20 AM (This is the date your [email or web address] access code will )    4.  Enter the last four digits of your Social Security Number (xxxx) and Date of Birth (mm/dd/yyyy) as indicated and click Submit. You will be taken to the next sign-up page. 5. Create a Hireologyt ID. This will be your [facility] login ID and cannot be changed, so think of one that is secure and easy to remember. 6. Create a [facility] password. You can change your password at any time. 7. Enter your Password Reset Question and Answer. This can be used at a later time if you forget your password. 8. Enter your e-mail address. You will receive e-mail notification when new information is available in [address]. 9. Click Sign Up. You can now view and download portions of your medical record. 10. Click the Download Summary menu link to download a portable copy of your medical information. Additional Information    If you have questions, please visit the Frequently Asked Questions section of the [employer] website at [email or web address] Remember, [facility] is NOT to be used for urgent needs. For medical emergencies, dial 911. Recommend clear fluids for 24 hours. Reintroduce solids slowly  Follow up with your primary care provider by calling on Friday for an appointment. Return to the ER at once for worsening symptoms.

## 2017-11-23 NOTE — ED TRIAGE NOTES
Alert male arrives to ED by EMS for c/o nausea, vomiting x 1 hr. Hx seizures. Pt also reports seeing black/white spots. Denies pain currently.

## 2017-12-27 ENCOUNTER — OFFICE VISIT (OUTPATIENT)
Dept: FAMILY MEDICINE CLINIC | Facility: CLINIC | Age: 22
End: 2017-12-27

## 2017-12-27 VITALS
TEMPERATURE: 97.4 F | OXYGEN SATURATION: 100 % | HEIGHT: 71 IN | WEIGHT: 165 LBS | HEART RATE: 86 BPM | RESPIRATION RATE: 15 BRPM | BODY MASS INDEX: 23.1 KG/M2 | SYSTOLIC BLOOD PRESSURE: 110 MMHG | DIASTOLIC BLOOD PRESSURE: 67 MMHG

## 2017-12-27 DIAGNOSIS — R42 DIZZINESS: ICD-10-CM

## 2017-12-27 DIAGNOSIS — H57.13 PAIN OF BOTH EYES: ICD-10-CM

## 2017-12-27 DIAGNOSIS — G40.419 EPILEPSY, GENERALIZED TONIC-CLONIC, INTRACTABLE (HCC): Primary | ICD-10-CM

## 2017-12-27 RX ORDER — MECLIZINE HCL 12.5 MG 12.5 MG/1
12.5 TABLET ORAL
Qty: 30 TAB | Refills: 0 | Status: SHIPPED | OUTPATIENT
Start: 2017-12-27 | End: 2018-01-06

## 2017-12-27 NOTE — PATIENT INSTRUCTIONS
Dizziness: Care Instructions  Your Care Instructions  Dizziness is the feeling of unsteadiness or fuzziness in your head. It is different than having vertigo, which is a feeling that the room is spinning or that you are moving or falling. It is also different from lightheadedness, which is the feeling that you are about to faint. It can be hard to know what causes dizziness. Some people feel dizzy when they have migraine headaches. Sometimes bouts of flu can make you feel dizzy. Some medical conditions, such as heart problems or high blood pressure, can make you feel dizzy. Many medicines can cause dizziness, including medicines for high blood pressure, pain, or anxiety. If a medicine causes your symptoms, your doctor may recommend that you stop or change the medicine. If it is a problem with your heart, you may need medicine to help your heart work better. If there is no clear reason for your symptoms, your doctor may suggest watching and waiting for a while to see if the dizziness goes away on its own. Follow-up care is a key part of your treatment and safety. Be sure to make and go to all appointments, and call your doctor if you are having problems. It's also a good idea to know your test results and keep a list of the medicines you take. How can you care for yourself at home? · If your doctor recommends or prescribes medicine, take it exactly as directed. Call your doctor if you think you are having a problem with your medicine. · Do not drive while you feel dizzy. · Try to prevent falls. Steps you can take include:  ¨ Using nonskid mats, adding grab bars near the tub, and using night-lights. ¨ Clearing your home so that walkways are free of anything you might trip on. ¨ Letting family and friends know that you have been feeling dizzy. This will help them know how to help you. When should you call for help? Call 911 anytime you think you may need emergency care.  For example, call if:  ? · You passed out (lost consciousness). ? · You have dizziness along with symptoms of a heart attack. These may include:  ¨ Chest pain or pressure, or a strange feeling in the chest.  ¨ Sweating. ¨ Shortness of breath. ¨ Nausea or vomiting. ¨ Pain, pressure, or a strange feeling in the back, neck, jaw, or upper belly or in one or both shoulders or arms. ¨ Lightheadedness or sudden weakness. ¨ A fast or irregular heartbeat. ? · You have symptoms of a stroke. These may include:  ¨ Sudden numbness, tingling, weakness, or loss of movement in your face, arm, or leg, especially on only one side of your body. ¨ Sudden vision changes. ¨ Sudden trouble speaking. ¨ Sudden confusion or trouble understanding simple statements. ¨ Sudden problems with walking or balance. ¨ A sudden, severe headache that is different from past headaches. ?Call your doctor now or seek immediate medical care if:  ? · You feel dizzy and have a fever, headache, or ringing in your ears. ? · You have new or increased nausea and vomiting. ? · Your dizziness does not go away or comes back. ? Watch closely for changes in your health, and be sure to contact your doctor if:  ? · You do not get better as expected. Where can you learn more? Go to http://isac-tonny.info/. Enter D632 in the search box to learn more about \"Dizziness: Care Instructions. \"  Current as of: March 20, 2017  Content Version: 11.4  © 4554-8558 [employer]. Care instructions adapted under license by [employer] (which disclaims liability or warranty for this information). If you have questions about a medical condition or this instruction, always ask your healthcare professional. [place] [address] any warranty or liability for your use of this information.

## 2017-12-27 NOTE — PROGRESS NOTES
History and Physical    Patient: Beny Casper MRN: [id number]  SSN: [id number]    YOB: 1995  Age: 25 y.o. Sex: male      Subjective:      Beny Casper is a 25 y.o. male who is presenting for recurrent spells of dizziness. Patient went to ED on 11/23 for nausea and vomiting and \"seeing spots. \"  Mother states patient has been feeling these symptoms since his Lamictal was increased to 3 tabs AM and 3.5 tabs pm mid-November, it is suppose to be increased further to 3.5 tabs BID but she has not done this d/t his symptoms. Mother bought BP cuff and noted BP readings elevated when patient feels dizzy, readings averaging around 140/80 during these times. Patient sees neurology, has follow up next month. Patient states that he will get eye pain when he feels dizzy, has been over 1 year since he was last seen by optometrist. Patients last seizure was in October, has absence seizures. 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 FamHx:  Family History   Problem Relation Age of Onset    No Known Problems Mother     No Known Problems Sister     No Known Problems Brother     Cancer Maternal Uncle     Seizures Maternal Uncle     Seizures Paternal Uncle     Cancer Maternal Grandmother     Diabetes Maternal Grandfather     High Cholesterol Maternal Grandfather     Seizures Other      Paternal Cousin, male    No Known Problems Sister     Cancer Other        Socialhx:  Social History   Substance Use Topics    Smoking status: Never Smoker    Smokeless tobacco: Never Used    Alcohol use No        Meds:  Prior to Admission medications    Medication Sig Start Date End Date Taking? Authorizing Provider   meclizine (ANTIVERT) 12.5 mg tablet Take 1 Tab by mouth three (3) times daily as needed for up to 10 days.  12/27/17 1/6/18 Yes Lawanda Rubinstein Pumphrey V, PA   ondansetron (ZOFRAN ODT) 4 mg disintegrating tablet Take 1 Tab by mouth every eight (8) hours as needed for Nausea. 11/23/17  Yes Marva Jett NP   lamoTRIgine (LAMICTAL) 150 mg tablet Take 2 Tabs by mouth two (2) times a day. Patient taking differently: Take 200 mg by mouth two (2) times a day. Indications: 400mg am, 600mg pm 7/7/17  Yes Farida Weiss MD   carBAMazepine XR (TEGRETOL XR) 400 mg SR tablet Take 1 Tab by mouth two (2) times a day. 4/6/16  Yes Mirtha Magdaleno MD   levETIRAcetam (KEPPRA) 500 mg tablet Take 1 Tab by mouth two (2) times a day. 4/6/16  Yes Mirtha Magdaleno MD        Allergies:  No Known Allergies    Review of Systems:  Items in bold are positive:  Constitutional: negative for fevers, chills and malaise  Eyes: eye pain   Ears, Nose, Mouth, Throat, and Face: negative for nasal congestion  Respiratory: negative for cough or SOB  Cardiovascular: negative for chest pain, chest pressure/discomfort  Gastrointestinal: negative for nausea, vomiting, melena, BRBPR, diarrhea, constipation and abdominal pain  Genitourinary:negative for frequency, dysuria, hesitancy and decreased stream  Musculoskeletal:negative for myalgias and arthralgias  Neurological: dizziness, negative for headaches, dizziness and paresthesia    Objective:     Vitals:    12/27/17 1148   BP: 110/67   Pulse: 86   Resp: 15   Temp: 97.4 °F (36.3 °C)   TempSrc: Oral   SpO2: 100%   Weight: 165 lb (74.8 kg)   Height: 5' 11\" (1.803 m)        Physical Exam:  GENERAL: alert, cooperative, no distress, appears stated age  HEENT: EYE: conjunctivae/corneas clear. EOM's intact. EAR: TM's pearly gray bilaterally NOSE: Nasal mucosa pink and moist bilaterally, THROAT: no erythema or edema  NECK: no adenopathy  LUNG: clear to auscultation bilaterally  HEART: regular rate and rhythm, S1, S2 normal, no murmur, click, rub or gallop  NEUROLOGIC: AOx3. Gait normal.          Assessment and Plan:       ICD-10-CM ICD-9-CM    1. Epilepsy, generalized tonic-clonic, intractable (HCC) G40.311 345.11 LAMOTRIGINE (LAMICTAL)      CARBAMAZEPINE      LAMOTRIGINE (LAMICTAL)      CARBAMAZEPINE      LEVETIRACETAM (KEPPRA)   2. Dizziness R42 780.4 meclizine (ANTIVERT) 12.5 mg tablet   3. Pain of both eyes H57.13 379.91 REFERRAL TO OPHTHALMOLOGY         Medical Decision Making:  Epilepsy- labs-follow up with neurology as scheduled    Dizziness-meclizine PRN    Eye pain-referral to opthamologist given to mother to self schedule    Follow-up Disposition:  Return in about 4 weeks (around 1/24/2018). Patient acknowledges understanding of instructions and acknowledges understanding to call back if current symptoms worsen or new symptoms arise. Patient acknowledges and agrees with plan.     Signed By: MUKUL Duarte     December 27, 2017

## 2017-12-27 NOTE — MR AVS SNAPSHOT
Visit Information Date & Time Provider Department Dept. Phone Encounter #  
 12/27/2017 11:30 AM Jolly Astorga  [phone] [id number] Follow-up Instructions Return in about 4 weeks (around 1/24/2018). Upcoming Health Maintenance Date Due DTaP/Tdap/Td series (1 - Tdap) 9/11/2016 Allergies as of 12/27/2017  Review Complete On: 12/27/2017 By: Brad Rosenthal LPN No Known Allergies Current Immunizations  Never Reviewed No immunizations on file. Not reviewed this visit You Were Diagnosed With   
  
 Codes Comments Epilepsy, generalized tonic-clonic, intractable ([facility]    -  Primary ICD-10-CM: L95.467 ICD-9-CM: 345.11 Dizziness     ICD-10-CM: Q08 ICD-9-CM: 780.4 Vitals BP Pulse Temp Resp Height(growth percentile) Weight(growth percentile) 110/67 (BP 1 Location: Right arm, BP Patient Position: Sitting) 86 97.4 °F (36.3 °C) (Oral) 15 5' 11\" (1.803 m) 165 lb (74.8 kg) SpO2 BMI Smoking Status 100% 23.01 kg/m2 Never Smoker BMI and BSA Data Body Mass Index Body Surface Area 23.01 kg/m 2 1.94 m 2 Preferred Pharmacy Pharmacy Name Phone Sand[facility] Triplett [phone] Your Updated Medication List  
  
   
This list is accurate as of: 12/27/17 12:06 PM.  Always use your most recent med list.  
  
  
  
  
 carBAMazepine  mg SR tablet Commonly known as:  TEGretol XR Take 1 Tab by mouth two (2) times a day. lamoTRIgine 150 mg tablet Commonly known as: LaMICtal  
Take 2 Tabs by mouth two (2) times a day. levETIRAcetam 500 mg tablet Commonly known as:  KEPPRA Take 1 Tab by mouth two (2) times a day. meclizine 12.5 mg tablet Commonly known as:  ANTIVERT Take 1 Tab by mouth three (3) times daily as needed for up to 10 days. ondansetron 4 mg disintegrating tablet Commonly known as:  ZOFRAN ODT Take 1 Tab by mouth every eight (8) hours as needed for Nausea. Prescriptions Sent to Pharmacy Refills  
 meclizine (ANTIVERT) 12.5 mg tablet 0 Sig: Take 1 Tab by mouth three (3) times daily as needed for up to 10 days. Class: Normal  
 [facility]acy: [facility] [address] [place] [address] Sandor Sharpe  #: [phone] Route: Oral  
  
Follow-up Instructions Return in about 4 weeks (around 1/24/2018). To-Do List   
 12/27/2017 Lab:  CARBAMAZEPINE   
  
 12/27/2017 Lab:  LAMOTRIGINE (LAMICTAL) Patient Instructions Dizziness: Care Instructions Your Care Instructions Dizziness is the feeling of unsteadiness or fuzziness in your head. It is different than having vertigo, which is a feeling that the room is spinning or that you are moving or falling. It is also different from lightheadedness, which is the feeling that you are about to faint. It can be hard to know what causes dizziness. Some people feel dizzy when they have migraine headaches. Sometimes bouts of flu can make you feel dizzy. Some medical conditions, such as heart problems or high blood pressure, can make you feel dizzy. Many medicines can cause dizziness, including medicines for high blood pressure, pain, or anxiety. If a medicine causes your symptoms, your doctor may recommend that you stop or change the medicine. If it is a problem with your heart, you may need medicine to help your heart work better. If there is no clear reason for your symptoms, your doctor may suggest watching and waiting for a while to see if the dizziness goes away on its own. Follow-up care is a key part of your treatment and safety. Be sure to make and go to all appointments, and call your doctor if you are having problems. It's also a good idea to know your test results and keep a list of the medicines you take. How can you care for yourself at home? · If your doctor recommends or prescribes medicine, take it exactly as directed. Call your doctor if you think you are having a problem with your medicine. · Do not drive while you feel dizzy. · Try to prevent falls. Steps you can take include: ¨ Using nonskid mats, adding grab bars near the tub, and using night-lights. ¨ Clearing your home so that walkways are free of anything you might trip on. ¨ Letting family and friends know that you have been feeling dizzy. This will help them know how to help you. When should you call for help? Call 911 anytime you think you may need emergency care. For example, call if: 
? · You passed out (lost consciousness). ? · You have dizziness along with symptoms of a heart attack. These may include: ¨ Chest pain or pressure, or a strange feeling in the chest. 
¨ Sweating. ¨ Shortness of breath. ¨ Nausea or vomiting. ¨ Pain, pressure, or a strange feeling in the back, neck, jaw, or upper belly or in one or both shoulders or arms. ¨ Lightheadedness or sudden weakness. ¨ A fast or irregular heartbeat. ? · You have symptoms of a stroke. These may include: 
¨ Sudden numbness, tingling, weakness, or loss of movement in your face, arm, or leg, especially on only one side of your body. ¨ Sudden vision changes. ¨ Sudden trouble speaking. ¨ Sudden confusion or trouble understanding simple statements. ¨ Sudden problems with walking or balance. ¨ A sudden, severe headache that is different from past headaches. ?Call your doctor now or seek immediate medical care if: 
? · You feel dizzy and have a fever, headache, or ringing in your ears. ? · You have new or increased nausea and vomiting. ? · Your dizziness does not go away or comes back. ? Watch closely for changes in your health, and be sure to contact your doctor if: 
? · You do not get better as expected. Where can you learn more? Go to http://isac-tonny.info/. Enter B540 in the search box to learn more about \"Dizziness: Care Instructions. \" Current as of: March 20, 2017 Content Version: 11.4 © 8482-0891 Healthwise, [employer]. Care instructions adapted under license by [employer] (which disclaims liability or warranty for this information). If you have questions about a medical condition or this instruction, always ask your healthcare professional. [address] any warranty or liability for your use of this information. Introducing [facility]! Tommy Lewis introduces [employer] patient portal. Now you can access parts of your medical record, email your doctor's office, and request medication refills online. 1. In your internet browser, go to [email or web address] 2. Click on the First Time User? Click Here link in the Sign In box. You will see the New Member Sign Up page. 3. Enter your [employer] Access Code exactly as it appears below. You will not need to use this code after youve completed the sign-up process. If you do not sign up before the expiration date, you must request a new code. · [employer] Access Code: [email or web address] Expires: 2/21/2018  3:20 AM 
 
4. Enter the last four digits of your Social Security Number (xxxx) and Date of Birth (mm/dd/yyyy) as indicated and click Submit. You will be taken to the next sign-up page. 5. Create a Interviewstreet ID. This will be your Interview[employer] login ID and cannot be changed, so think of one that is secure and easy to remember. 6. Create a [employer] password. You can change your password at any time. 7. Enter your Password Reset Question and Answer. This can be used at a later time if you forget your password. 8. Enter your e-mail address. You will receive e-mail notification when new information is available in [address] 9. Click Sign Up. You can now view and download portions of your medical record. 10. Click the Download Summary menu link to download a portable copy of your medical information. If you have questions, please visit the Frequently Asked Questions section of the [facility] website. Remember, [facility] is NOT to be used for urgent needs. For medical emergencies, dial 911. Now available from your iPhone and Android! Please provide this summary of care documentation to your next provider. Your primary care clinician is listed as [address] [place] [address]. If you have any questions after today's visit, please call [phone].

## 2017-12-27 NOTE — PROGRESS NOTES
Fadi Perez is a 25 y.o.  male presents today for office visit for follow up. Pt would also like to discuss dizziness. Pt is not fasting. Pt is in Room # 9      1. Have you been to the ER, urgent care clinic since your last visit? Hospitalized since your last visit? Yes When: NOv. 22 Where: [facility]  Reason for visit: dizziness    2. Have you seen or consulted any other health care providers outside of the [address] [place] [address] since your last visit? Include any pap smears or colon screening. Upcoming Appts  None    Health Maintenance reviewed     VORB: No orders of the defined types were placed in this encounter.   Juan Junior LPN

## 2017-12-29 ENCOUNTER — TELEPHONE (OUTPATIENT)
Dept: FAMILY MEDICINE CLINIC | Facility: CLINIC | Age: 22
End: 2017-12-29

## 2017-12-29 LAB
CARBAMAZEPINE SERPL-MCNC: 7.4 UG/ML (ref 4–12)
LAMOTRIGINE SERPL-MCNC: 17.9 UG/ML (ref 2–20)
LEVETIRACETAM SERPL-MCNC: 1.8 UG/ML (ref 10–40)

## 2017-12-29 NOTE — PROGRESS NOTES
Please advise labs do not show toxic levels of antiseizure meds, keppra level is low, follow up with neuro

## 2017-12-29 NOTE — TELEPHONE ENCOUNTER
Please advise labs do not show toxic levels of antiseizure meds, keppra level is low, follow up with neuro     Spoke with pt in regards to results. Pt acknowledges understanding and voices no concerns at this time.

## 2018-01-10 ENCOUNTER — TELEPHONE (OUTPATIENT)
Dept: FAMILY MEDICINE CLINIC | Facility: CLINIC | Age: 23
End: 2018-01-10

## 2018-01-10 NOTE — TELEPHONE ENCOUNTER
Received a call from the pt's mother. Two pt identifier's verified, requesting for labs to be faxed to Dr. Autumn Moe, Neurology. Pt has an appt on 1/12/18. Fax sent to [phone].

## 2018-01-24 ENCOUNTER — OFFICE VISIT (OUTPATIENT)
Dept: FAMILY MEDICINE CLINIC | Facility: CLINIC | Age: 23
End: 2018-01-24

## 2018-01-24 VITALS
TEMPERATURE: 97.6 F | RESPIRATION RATE: 12 BRPM | HEART RATE: 81 BPM | BODY MASS INDEX: 23.38 KG/M2 | OXYGEN SATURATION: 95 % | SYSTOLIC BLOOD PRESSURE: 105 MMHG | DIASTOLIC BLOOD PRESSURE: 62 MMHG | HEIGHT: 71 IN | WEIGHT: 167 LBS

## 2018-01-24 DIAGNOSIS — H57.13 PAIN OF BOTH EYES: ICD-10-CM

## 2018-01-24 DIAGNOSIS — G40.909 SEIZURE DISORDER (HCC): Primary | ICD-10-CM

## 2018-01-24 DIAGNOSIS — Z79.899 LONG-TERM USE OF HIGH-RISK MEDICATION: ICD-10-CM

## 2018-01-24 DIAGNOSIS — Z87.898 HISTORY OF DIZZINESS: ICD-10-CM

## 2018-01-24 NOTE — PROGRESS NOTES
Chief Complaint   Patient presents with    Follow-up     Epilepsy, Dizziness    Eye Pain     Visit Vitals    /62 (BP 1 Location: Left arm, BP Patient Position: Sitting)    Pulse 81    Temp 97.6 °F (36.4 °C) (Oral)    Resp 12    Ht 5' 11\" (1.803 m)    Wt 167 lb (75.8 kg)    SpO2 95%    BMI 23.29 kg/m2       Patient is not fasting. Patient in room # 4.     1. Have you been to the ER, urgent care clinic since your last visit? Hospitalized since your last visit? No    2. Have you seen or consulted any other health care providers outside of the [address] [place] [address] since your last visit? Include any pap smears or colon screening. No     Reviewed. TDAP declined. Upcoming Appt.  Eye doctor on 01/25/2018, [employer]

## 2018-01-24 NOTE — MR AVS SNAPSHOT
[address] [address] 
[phone] Patient: Vaibhav Shah MRN: [id number] YDI:5/62/2803 Visit Information Date & Time Provider Department Dept. Phone Encounter #  
 1/24/2018  1:00 PM aLne Chavarria MD [facility] [phone] [id number] Follow-up Instructions Return in about 1 month (around 2/24/2018) for follow up of chronic condition. Upcoming Health Maintenance Date Due DTaP/Tdap/Td series (2 - Td) 1/24/2028 Allergies as of 1/24/2018  Review Complete On: 1/24/2018 By: Jamil Olguin LPN No Known Allergies Current Immunizations  Never Reviewed No immunizations on file. Not reviewed this visit You Were Diagnosed With   
  
 Codes Comments Seizure disorder ([facility]    -  Primary ICD-10-CM: I25.117 ICD-9-CM: 345.90 History of dizziness     ICD-10-CM: Z87.898 ICD-9-CM: V13.89 Pain of both eyes     ICD-10-CM: H57.13 ICD-9-CM: 379.91 Long-term use of high-risk medication     ICD-10-CM: Z79.899 ICD-9-CM: V58.69 Vitals BP Pulse Temp Resp Height(growth percentile) Weight(growth percentile) 105/62 (BP 1 Location: Left arm, BP Patient Position: Sitting) 81 97.6 °F (36.4 °C) (Oral) 12 5' 11\" (1.803 m) 167 lb (75.8 kg) SpO2 BMI Smoking Status 95% 23.29 kg/m2 Never Smoker BMI and BSA Data Body Mass Index Body Surface Area  
 23.29 kg/m 2 1.95 m 2 Preferred Pharmacy Pharmacy Name Phone [facility] [phone] Your Updated Medication List  
  
   
This list is accurate as of: 1/24/18  1:30 PM.  Always use your most recent med list.  
  
  
  
  
 carBAMazepine  mg SR tablet Commonly known as:  TEGretol XR Take 1 Tab by mouth two (2) times a day. lamoTRIgine 150 mg tablet Commonly known as:   LaMICtal  
 Take 2 Tabs by mouth two (2) times a day. levETIRAcetam 500 mg tablet Commonly known as:  KEPPRA Take 1 Tab by mouth two (2) times a day. ondansetron 4 mg disintegrating tablet Commonly known as:  ZOFRAN ODT Take 1 Tab by mouth every eight (8) hours as needed for Nausea. Follow-up Instructions Return in about 1 month (around 2/24/2018) for follow up of chronic condition. To-Do List   
 01/24/2018 Lab:  CARBAMAZEPINE   
  
 01/24/2018 Lab:  LEVETIRACETAM (KEPPRA) Introducing \[facility]\"" & [facility] SERVICES! Silvia Velazquez introduces [employer]AL[employer] patient portal. Now you can access parts of your medical record, email your doctor's office, and request medication refills online. 1. In your internet browser, go to [email or web address] 2. Click on the First Time User? Click Here link in the Sign In box. You will see the New Member Sign Up page. 3. Enter your [employer] Access Code exactly as it appears below. You will not need to use this code after youve completed the sign-up process. If you do not sign up before the expiration date, you must request a new code. · [employer] Access Code: [email or web address] Expires: 2/21/2018  3:20 AM 
 
4. Enter the last four digits of your Social Security Number (xxxx) and Date of Birth (mm/dd/yyyy) as indicated and click Submit. You will be taken to the next sign-up page. 5. Create a [employer] ID. This will be your [employer] login ID and cannot be changed, so think of one that is secure and easy to remember. 6. Create a [employer]A password. You can change your password at any time. 7. Enter your Password Reset Question and Answer. This can be used at a later time if you forget your password. 8. Enter your e-mail address. You will receive e-mail notification when new information is available in [address] 9. Click Sign Up. You can now view and download portions of your medical record. 10. Click the Download Summary menu link to download a portable copy of your medical information. If you have questions, please visit the Frequently Asked Questions section of the [facility] website. Remember, [facility] is NOT to be used for urgent needs. For medical emergencies, dial 911. Now available from your iPhone and Android! Please provide this summary of care documentation to your next provider. Your primary care clinician is listed as Phani Babin If you have any questions after today's visit, please call [phone].

## 2018-01-24 NOTE — PROGRESS NOTES
History:   Ed Pfeiffer is a 25 y.o. male presenting today for Follow-up (Epilepsy, Dizziness) and Eye Pain    Pt presents today for routine follow up. History is significant for developmental delay and seizure disorder. Pt is accompanied by his mother who provides much of the history. His mother reports no seizures since his last follow-up visit with pcp a month ago. He is currently followed by neurology for history of seizure disorder since age 15. He is currently on a regimen of Lamictal, Tegretol XL, and Keppra. Pt has a history of chronic dizziness and reports a brief episode a few days ago, denies history of falls, headache, tongue biting, n/v, loss of bladder or bowel contents, or confusion. Mother reports that symptoms have improved. He was prescribed meclizine which seems to help with his episodes. He also has a history of bilateral eye pain associated with the dizziness and was referred to ophthalmology for further evaluation and will follow up this month. Pt is asymptomatic at this time. There are no new concerns at this time. 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 Social History     Social History    Marital status: SINGLE     Spouse name: N/A    Number of children: N/A    Years of education: N/A     Occupational History    Not on file.      Social History Main Topics    Smoking status: Never Smoker    Smokeless tobacco: Never Used    Alcohol use No    Drug use: No    Sexual activity: Not Currently     Other Topics Concern    Not on file     Social History Narrative       Family History   Problem Relation Age of Onset    No Known Problems Mother     No Known Problems Sister     No Known Problems Brother     Cancer Maternal Uncle     Seizures Maternal Uncle     Seizures Paternal Uncle     Cancer Maternal Grandmother     Diabetes Maternal Grandfather     High Cholesterol Maternal Grandfather     Seizures Other      Paternal Cousin, male   [address] No Known Problems Sister     Cancer Other        Current Outpatient Prescriptions on File Prior to Visit   Medication Sig Dispense Refill    ondansetron (ZOFRAN ODT) 4 mg disintegrating tablet Take 1 Tab by mouth every eight (8) hours as needed for Nausea. 10 Tab 0    lamoTRIgine (LAMICTAL) 150 mg tablet Take 2 Tabs by mouth two (2) times a day. (Patient taking differently: Take 200 mg by mouth two (2) times a day. Indications: 400mg am, 600mg pm) 120 Tab 0    carBAMazepine XR (TEGRETOL XR) 400 mg SR tablet Take 1 Tab by mouth two (2) times a day. 60 Tab 3    levETIRAcetam (KEPPRA) 500 mg tablet Take 1 Tab by mouth two (2) times a day. 60 Tab 3     No current facility-administered medications on file prior to visit. No Known Allergies    Review of Systems   Constitutional: Negative. HENT: Negative. Eyes: Positive for pain. Respiratory: Negative. Cardiovascular: Negative. Gastrointestinal: Negative. Musculoskeletal: Negative. Skin: Negative. Neurological: Positive for dizziness and seizures (none in over a month). Negative for tingling, tremors, sensory change, speech change, focal weakness and headaches. Objective:   VS:    Visit Vitals    /62 (BP 1 Location: Left arm, BP Patient Position: Sitting)    Pulse 81    Temp 97.6 °F (36.4 °C) (Oral)    Resp 12    Ht 5' 11\" (1.803 m)    Wt 167 lb (75.8 kg)    SpO2 95%    BMI 23.29 kg/m2     Physical Exam   Cardiovascular: Normal rate, regular rhythm, normal heart sounds and intact distal pulses. Pulmonary/Chest: Effort normal and breath sounds normal.   Nursing note and vitals reviewed. Assessment/ Plan:     Diagnoses and all orders for this visit:    1.  Seizure disorder ([address]      - No seizures in past month      - Continue current medications      - Follow up with neurology    2. History of dizziness      - Improving      - Meclizine prn    3. Pain of both eyes      - No acute changes at this time      - Pt to follow up with ophthalmology for further evaluation    4. Long-term use of high-risk medication  -     CARBAMAZEPINE; Future  -     LEVETIRACETAM (KEPPRA); Future     I have discussed the diagnosis with the patient and the intended plan as seen in the above orders. The patient verbalized understanding and agrees with the plan.       Follow-up Disposition: Not on [facility], MD

## 2018-01-26 LAB
CARBAMAZEPINE SERPL-MCNC: 7.3 UG/ML (ref 4–12)
LEVETIRACETAM SERPL-MCNC: 1.7 UG/ML (ref 10–40)

## 2018-04-30 ENCOUNTER — OFFICE VISIT (OUTPATIENT)
Dept: FAMILY MEDICINE CLINIC | Facility: CLINIC | Age: 23
End: 2018-04-30

## 2018-04-30 VITALS
HEIGHT: 71 IN | SYSTOLIC BLOOD PRESSURE: 96 MMHG | BODY MASS INDEX: 22.6 KG/M2 | TEMPERATURE: 98.1 F | OXYGEN SATURATION: 98 % | DIASTOLIC BLOOD PRESSURE: 61 MMHG | HEART RATE: 75 BPM | WEIGHT: 161.4 LBS

## 2018-04-30 DIAGNOSIS — G40.909 SEIZURE DISORDER (HCC): Primary | ICD-10-CM

## 2018-04-30 DIAGNOSIS — Z87.898 HISTORY OF SNORING: ICD-10-CM

## 2018-04-30 NOTE — PROGRESS NOTES
Subjective:   Janet Pike is a 25 y.o.  male who presents for Follow-up and Snoring (month ago very loud.)    Mr. Dominique Mercado presents today for routine follow up. History is significant for developmental delay and seizure disorder. He is accompanied by his mother who provides the history. Pt's mother reports patient has been doing well since his last visit. She reports two absence seizures since his last visit. She denies shaking episodes, tongue biting, loss of bladder or bowel contents. Pt is compliant with medication regimen including Lamictal, Tegretol XL, and Keppra. He is currently followed by neurology specialist Dr. Jasiel Maldonado. Pt's mother is attempting to obtain a  in the home to assist with patient's ADLs. Mother reports that patient was referred for sleep study, but was unable to have the study due to insurance issues. Pt has a history of loud snoring and daytime somnolence. There are no other concerns at this time. Review of Systems   Constitutional: Negative. HENT: Negative. Eyes: Negative. Respiratory: Negative. Cardiovascular: Negative. Gastrointestinal: Negative. Genitourinary: Negative. Musculoskeletal: Negative. Skin: Negative. Neurological: Positive for seizures. Negative for dizziness, tingling, tremors, sensory change, speech change, focal weakness, loss of consciousness and headaches. Endo/Heme/Allergies: Negative. Current Outpatient Prescriptions on File Prior to Visit   Medication Sig Dispense Refill    ondansetron (ZOFRAN ODT) 4 mg disintegrating tablet Take 1 Tab by mouth every eight (8) hours as needed for Nausea. 10 Tab 0    lamoTRIgine (LAMICTAL) 150 mg tablet Take 2 Tabs by mouth two (2) times a day. (Patient taking differently: Take 200 mg by mouth two (2) times a day. Indications: 400mg am, 600mg pm) 120 Tab 0    carBAMazepine XR (TEGRETOL XR) 400 mg SR tablet Take 1 Tab by mouth two (2) times a day.  60 Tab 3    levETIRAcetam (KEPPRA) 500 mg tablet Take 1 Tab by mouth two (2) times a day. 60 Tab 3     No current facility-administered medications on file prior to visit. Reviewed PmHx, RxHx, FmHx, SocHx, AllgHx and updated and dated in the chart. Nurse notes were reviewed and are correct    Objective:     Vitals:    04/30/18 1522 04/30/18 1532   BP: 97/67 96/61   Pulse: 75    Temp: 98.1 °F (36.7 °C)    SpO2: 98%    Weight: 161 lb 6.4 oz (73.2 kg)    Height: 5' 11\" (1.803 m)      Physical Exam   Constitutional: He is oriented to person, place, and time. He appears well-developed and well-nourished. HENT:   Head: Normocephalic and atraumatic. Eyes: EOM are normal. Pupils are equal, round, and reactive to light. Neck: Normal range of motion. Neck supple. Cardiovascular: Normal rate, regular rhythm, normal heart sounds and intact distal pulses. Pulmonary/Chest: Effort normal and breath sounds normal.   Abdominal: Soft. Bowel sounds are normal.   Neurological: He is alert and oriented to person, place, and time. Skin: Skin is warm and dry. Nursing note and vitals reviewed. Assessment/ Plan:     Diagnoses and all orders for this visit:    1. Seizure disorder ([address]        -     Stable        -     Continue current regimen        -     Follow up with neurology as scheduled     2. History of snoring  -     SLEEP MEDICINE REFERRAL     I have discussed the diagnosis with the patient and the intended plan as seen in the above orders. The patient verbalized understanding and agrees with the plan.     Follow-up Disposition: Not on [facility], MD

## 2018-04-30 NOTE — MR AVS SNAPSHOT
[address] [address] 
[phone] Patient: Doug Patiño MRN: [id number] HKI:[birth year] Visit Information Date & Time Provider Department Dept. Phone Encounter #  
 4/30/2018  3:30 PM Marian Hong MD [facility] [phone] [id number] Follow-up Instructions Return in about 6 months (around 10/30/2018) for chronic conditions. Upcoming Health Maintenance Date Due Influenza Age 5 to Adult 8/1/2018 DTaP/Tdap/Td series (2 - Td) 1/24/2028 Allergies as of 4/30/2018  Review Complete On: 4/30/2018 By: Raquel Miller LPN No Known Allergies Current Immunizations  Never Reviewed No immunizations on file. Not reviewed this visit You Were Diagnosed With   
  
 Codes Comments [facility]    -  Primary ICD-10-CM: O37.435 ICD-9-CM: 345.90 History of snoring     ICD-10-CM: Z87.898 ICD-9-CM: V15.89 Vitals BP Pulse Temp Height(growth percentile) Weight(growth percentile) SpO2  
 96/61 (BP 1 Location: Right arm, BP Patient Position: Sitting) 75 98.1 °F (36.7 °C) 5' 11\" (1.803 m) 161 lb 6.4 oz (73.2 kg) 98% BMI Smoking Status 22.51 kg/m2 Never Smoker Vitals History BMI and BSA Data Body Mass Index Body Surface Area  
 22.51 kg/m 2 1.91 m 2 [facility]erred Pharm[phone] Your Updated Medication List  
  
   
This list is accurate as of 4/30/18  4:15 PM.  Always use your most recent med list.  
  
  
  
  
 carBAMazepine  mg SR tablet Commonly known as:  TEGretol XR Take 1 Tab by mouth two (2) times a day. lamoTRIgine 150 mg tablet Commonly known as: LaMICtal  
Take 2 Tabs by mouth two (2) times a day. levETIRAcetam 500 mg tablet Commonly known as:  KEPPRA Take 1 Tab by mouth two (2) times a day. ondansetron 4 mg disintegrating tablet Commonly known as:  ZOFRAN ODT Take 1 Tab by mouth every eight (8) hours as needed for Nausea. We Performed the Following SLEEP MEDICINE REFERRAL [VLQ572 Custom] Comments:  
 Please evaluate for possible obstructive sleep apnea. Follow-up Instructions Return in about 6 months (around 10/30/2018) for chronic conditions. Referral Information Referral ID Referred By Referred To  
  
 [id number] DEIRDRE III, NERISSA LÓPEZ Not Available Visits Status Start Date End Date 1 New Request 4/30/18 4/30/19 If your referral has a status of pending review or denied, additional information will be sent to support the outcome of this decision. Introducing [facility] & HEALTH SERVICES! [facility] introduces EnglishUp patient portal. Now you can access parts of your medical record, email your doctor's office, and request medication refills online. 1. In your internet browser, go to [email or web address] 2. Click on the First Time User? Click Here link in the Sign In box. You will see the New Member Sign Up page. 3. Enter your EnglishUp Access Code exactly as it appears below. You will not need to use this code after youve completed the sign-up process. If you do not sign up before the expiration date, you must request a new code. · EnglishUp Access Code: [email or web address] Expires: 7/29/2018  4:14 PM 
 
4. Enter the last four digits of your Social Security Number (xxxx) and Date of Birth (mm/dd/yyyy) as indicated and click Submit. You will be taken to the next sign-up page. 5. Create a APRt ID. This will be your EnglishUp login ID and cannot be changed, so think of one that is secure and easy to remember. 6. Create a EnglishUp password. You can change your password at any time. 7. Enter your Password Reset Question and Answer.  This can be used at a later time if you forget your password. 8. Enter your e-mail address. You will receive e-mail notification when new information is available in [address] 9. Click Sign Up. You can now view and download portions of your medical record. 10. Click the Download Summary menu link to download a portable copy of your medical information. If you have questions, please visit the Frequently Asked Questions section of the Activehours website. Remember, Activehours is NOT to be used for urgent needs. For medical emergencies, dial 911. Now available from your iPhone and Android! Please provide this summary of care documentation to your next provider. Your primary care clinician is listed as Phani Babin If you have any questions after today's visit, please call [phone].

## 2018-04-30 NOTE — PROGRESS NOTES
Layne Murphy is a 25 y.o.@ presents today for office visit for follow up. Pt is in Room # 5.     1. Have you been to the ER, urgent care clinic since your last visit? Hospitalized since your last visit? No    2. Have you seen or consulted any other health care providers outside of the [address] [place] [address] since your last visit? Include any pap smears or colon screening. No         Health Maintenance reviewed - defered. .    Requested Prescriptions      No prescriptions requested or ordered in this encounter       Visit Vitals    BP 96/61 (BP 1 Location: Right arm, BP Patient Position: Sitting)    Pulse 75    Temp 98.1 °F (36.7 °C)    Ht 5' 11\" (1.803 m)    Wt 161 lb 6.4 oz (73.2 kg)    SpO2 98%    BMI 22.51 kg/m2          Upcoming Appts  Nol.     VORB: No orders of the defined types were placed in this encounter.   MD Patricia Vizcaino Mc, LPN

## 2018-07-31 ENCOUNTER — TELEPHONE (OUTPATIENT)
Dept: FAMILY MEDICINE CLINIC | Facility: CLINIC | Age: 23
End: 2018-07-31

## 2018-07-31 NOTE — TELEPHONE ENCOUNTER
Patient's mother called saying her son has had 3 seizures this morning 12-15 mins apart and one yesterday. Patient's mom also said patient's leg is twitching at the moment. Patient is followed by neurology and patient is taking all seizure medications at this time. LPN advise patient's mother to call his neurologist to see if he could be seen. If she can not get in contact with his neurologist go to the emergency room. Pt's mother acknowledges understanding and voices no concerns at this time.

## 2018-10-12 ENCOUNTER — HOSPITAL ENCOUNTER (OUTPATIENT)
Dept: LAB | Age: 23
Discharge: HOME | End: 2018-10-12
Payer: COMMERCIAL

## 2018-10-12 ENCOUNTER — OFFICE VISIT (OUTPATIENT)
Dept: FAMILY MEDICINE CLINIC | Facility: CLINIC | Age: 23
End: 2018-10-12

## 2018-10-12 VITALS
OXYGEN SATURATION: 98 % | SYSTOLIC BLOOD PRESSURE: 102 MMHG | BODY MASS INDEX: 22.26 KG/M2 | TEMPERATURE: 98.1 F | DIASTOLIC BLOOD PRESSURE: 59 MMHG | HEART RATE: 78 BPM | HEIGHT: 71 IN | RESPIRATION RATE: 15 BRPM | WEIGHT: 159 LBS

## 2018-10-12 DIAGNOSIS — K01.1 IMPACTED TEETH: ICD-10-CM

## 2018-10-12 DIAGNOSIS — Z01.818 PREOP EXAMINATION: ICD-10-CM

## 2018-10-12 DIAGNOSIS — R62.50 DEVELOPMENTAL DELAY: ICD-10-CM

## 2018-10-12 DIAGNOSIS — J30.9 ALLERGIC RHINITIS, UNSPECIFIED SEASONALITY, UNSPECIFIED TRIGGER: ICD-10-CM

## 2018-10-12 DIAGNOSIS — G40.909 SEIZURE DISORDER (HCC): ICD-10-CM

## 2018-10-12 DIAGNOSIS — Z01.818 PREOP EXAMINATION: Primary | ICD-10-CM

## 2018-10-12 LAB
ANION GAP SERPL CALC-SCNC: 8 MMOL/L (ref 3–18)
BASOPHILS # BLD: 0 K/UL (ref 0–0.1)
BASOPHILS NFR BLD: 1 % (ref 0–2)
BUN SERPL-MCNC: 18 MG/DL (ref 7–18)
BUN/CREAT SERPL: 23 (ref 12–20)
CALCIUM SERPL-MCNC: 8.9 MG/DL (ref 8.5–10.1)
CHLORIDE SERPL-SCNC: 107 MMOL/L (ref 100–108)
CO2 SERPL-SCNC: 27 MMOL/L (ref 21–32)
CREAT SERPL-MCNC: 0.77 MG/DL (ref 0.6–1.3)
DIFFERENTIAL METHOD BLD: ABNORMAL
EOSINOPHIL # BLD: 0.1 K/UL (ref 0–0.4)
EOSINOPHIL NFR BLD: 4 % (ref 0–5)
ERYTHROCYTE [DISTWIDTH] IN BLOOD BY AUTOMATED COUNT: 14.1 % (ref 11.6–14.5)
GLUCOSE SERPL-MCNC: 87 MG/DL (ref 74–99)
HCT VFR BLD AUTO: 42.1 % (ref 36–48)
HGB BLD-MCNC: 13.6 G/DL (ref 13–16)
LYMPHOCYTES # BLD: 1.1 K/UL (ref 0.9–3.6)
LYMPHOCYTES NFR BLD: 32 % (ref 21–52)
MCH RBC QN AUTO: 29 PG (ref 24–34)
MCHC RBC AUTO-ENTMCNC: 32.3 G/DL (ref 31–37)
MCV RBC AUTO: 89.8 FL (ref 74–97)
MONOCYTES # BLD: 0.4 K/UL (ref 0.05–1.2)
MONOCYTES NFR BLD: 11 % (ref 3–10)
NEUTS SEG # BLD: 1.7 K/UL (ref 1.8–8)
NEUTS SEG NFR BLD: 52 % (ref 40–73)
PLATELET # BLD AUTO: 216 K/UL (ref 135–420)
PMV BLD AUTO: 10.4 FL (ref 9.2–11.8)
POTASSIUM SERPL-SCNC: 4.5 MMOL/L (ref 3.5–5.5)
RBC # BLD AUTO: 4.69 M/UL (ref 4.7–5.5)
SODIUM SERPL-SCNC: 142 MMOL/L (ref 136–145)
WBC # BLD AUTO: 3.3 K/UL (ref 4.6–13.2)

## 2018-10-12 PROCEDURE — 80048 BASIC METABOLIC PNL TOTAL CA: CPT | Performed by: INTERNAL MEDICINE

## 2018-10-12 PROCEDURE — 85025 COMPLETE CBC W/AUTO DIFF WBC: CPT | Performed by: INTERNAL MEDICINE

## 2018-10-12 PROCEDURE — 36415 COLL VENOUS BLD VENIPUNCTURE: CPT | Performed by: INTERNAL MEDICINE

## 2018-10-12 NOTE — MR AVS SNAPSHOT
[place] [address] [address] 
[phone] Patient: Mariana Timmons MRN: [id number] TRANG:[birth year] Visit Information Date & Time Provider Department Dept. Phone Encounter #  
 10/12/2018 11:45 AM Alvin Alegre, [facility] [phone] [id number] Follow-up Instructions Return in about 3 months (around 1/12/2019) for routine follow-up. Upcoming Health Maintenance Date Due Influenza Age 5 to Adult 8/1/2018 DTaP/Tdap/Td series (2 - Td) 1/24/2028 Allergies as of 10/12/2018  Review Complete On: 10/12/2018 By: Apple Kevin LPN No Known Allergies Current Immunizations  Never Reviewed No immunizations on file. Not reviewed this visit You Were Diagnosed With   
  
 Codes Comments Preop examination    -  Primary ICD-10-CM: L24.729 ICD-9-CM: V72.84 [facility]     ICD-10-CM: G40.909 ICD-9-CM: 345.90 Allergic rhinitis, unspecified seasonality, unspecified trigger     ICD-10-CM: J30.9 ICD-9-CM: 477.9 Vitals BP Pulse Temp Resp Height(growth percentile) Weight(growth percentile) 102/59 (BP 1 Location: Right arm, BP Patient Position: Sitting) 78 98.1 °F (36.7 °C) (Oral) 15 5' 11\" (1.803 m) 159 lb (72.1 kg) SpO2 BMI Smoking Status 98% 22.18 kg/m2 Never Smoker Vitals History BMI and BSA Data Body Mass Index Body Surface Area  
 22.18 kg/m 2 1.9 m 2 Preferred Pharmacy Pharmacy Name Phone [facility] [phone] Your Updated Medication List  
  
   
This list is accurate as of 10/12/18 12:27 PM.  Always use your most recent med list.  
  
  
  
  
 carBAMazepine  mg SR tablet Commonly known as:  TEGretol XR Take 1 Tab by mouth two (2) times a day. lamoTRIgine 150 mg tablet Commonly known as:   LaMICtal  
 Take 2 Tabs by mouth two (2) times a day. levETIRAcetam 500 mg tablet Commonly known as:  KEPPRA Take 1 Tab by mouth two (2) times a day. ondansetron 4 mg disintegrating tablet Commonly known as:  ZOFRAN ODT Take 1 Tab by mouth every eight (8) hours as needed for Nausea. Follow-up Instructions Return in about 3 months (around 1/12/2019) for routine follow-up. To-Do List   
 10/12/2018 Lab:  CBC WITH AUTOMATED DIFF   
  
 10/12/2018 Lab:  METABOLIC PANEL, BASIC Introducing [facility] & HEALTH SERVICES! [facility] introduces Parkmobile patient portal. Now you can access parts of your medical record, email your doctor's office, and request medication refills online. 1. In your internet browser, go to [email or web address] 2. Click on the First Time User? Click Here link in the Sign In box. You will see the New Member Sign Up page. 3. Enter your Parkmobile Access Code exactly as it appears below. You will not need to use this code after youve completed the sign-up process. If you do not sign up before the expiration date, you must request a new code. · Parkmobile Access Code: [email or web address] Expires: 1/10/2019 12:27 PM 
 
4. Enter the last four digits of your Social Security Number (xxxx) and Date of Birth (mm/dd/yyyy) as indicated and click Submit. You will be taken to the next sign-up page. 5. Create a Parkmobile ID. This will be your Parkmobile login ID and cannot be changed, so think of one that is secure and easy to remember. 6. Create a Parkmobile password. You can change your password at any time. 7. Enter your Password Reset Question and Answer. This can be used at a later time if you forget your password. 8. Enter your e-mail address. You will receive e-mail notification when new information is available in [address] 9. Click Sign Up. You can now view and download portions of your medical record. 10. Click the Download Summary menu link to download a portable copy of your medical information. If you have questions, please visit the Frequently Asked Questions section of the GroupSpaces website. Remember, GroupSpaces is NOT to be used for urgent needs. For medical emergencies, dial 911. Now available from your iPhone and Android! Please provide this summary of care documentation to your next provider. Your primary care clinician is listed as Phani Babin If you have any questions after today's visit, please call [phone].

## 2018-10-12 NOTE — PROGRESS NOTES
Terrel Severs is a 21 y.o.@ presents today for office visit for follow up 1. Have you been to the ER, urgent care clinic since your last visit? Hospitalized since your last visit? No    2. Have you seen or consulted any other health care providers outside of the [address] [place] [address] since your last visit? Include any pap smears or colon screening. No         Health Maintenance reviewed - defered/.     Requested Prescriptions      No prescriptions requested or ordered in this encounter       Visit Vitals    /59 (BP 1 Location: Right arm, BP Patient Position: Sitting)    Pulse 78    Temp 98.1 °F (36.7 °C) (Oral)    Resp 15    Ht 5' 11\" (1.803 m)    Wt 159 lb (72.1 kg)    SpO2 98%    BMI 22.18 kg/m2          Upcoming Appts  NO.     VORB: No orders of the defined types were placed in this encounter.   MD Francis Suggs, LORRIN

## 2018-10-24 NOTE — PROGRESS NOTES
Subjective:   Mars Ashton is a 21 y.o.  male who presents for evaluation prior to surgery and routine follow-up. Mr. Rk Marquez returns today for evaluation prior to wisdom tooth extraction. Pt is scheduled for tooth extraction with Dr. Viridiana Raines on 11/1/18 at [facility] for impacted wisdom teeth. History is significant for developmental delay and seizure disorder. Efraín Funez is accompanied by his mother who provides the much of the history. Mother reports patient has been doing well overall since his last visit. She does report patient experiencing 2 days of nasal congestion, postnasal drip, and occasional non productive cough without fever/chills, headache, or muscle aches. In terms of seizure disorder, he has experienced 3 absence seizures over the past 6 months. This represents no change in frequency. Mother denies shaking episodes, tongue biting, loss of bladder or bowel contents, confusion. Pt is compliant with medication regimen including Lamictal, Tegretol XL, and Keppra. He is currently followed by neurologist Dr. Judah Dandy and is scheduled for evaluation prior to his procedure. Review of Systems   Constitutional: Negative for chills and fever. HENT: Positive for congestion. Negative for ear pain, sinus pain and sore throat. Eyes: Negative for blurred vision and double vision. Respiratory: Positive for cough. Negative for hemoptysis, sputum production, shortness of breath and wheezing. Cardiovascular: Negative for chest pain. Gastrointestinal: Negative for abdominal pain, blood in stool, constipation, diarrhea, melena, nausea and vomiting. Genitourinary: Negative. Musculoskeletal: Negative. Skin: Negative. Neurological: Positive for seizures. Negative for dizziness, tingling, tremors, sensory change, speech change, focal weakness, loss of consciousness and headaches. Psychiatric/Behavioral: Negative.         Current Outpatient Medications on File Prior to Visit Medication Sig Dispense Refill    ondansetron (ZOFRAN ODT) 4 mg disintegrating tablet Take 1 Tab by mouth every eight (8) hours as needed for Nausea. 10 Tab 0    lamoTRIgine (LAMICTAL) 150 mg tablet Take 2 Tabs by mouth two (2) times a day. (Patient taking differently: Take 200 mg by mouth two (2) times a day. Indications: 400mg am, 600mg pm) 120 Tab 0    carBAMazepine XR (TEGRETOL XR) 400 mg SR tablet Take 1 Tab by mouth two (2) times a day. 60 Tab 3    levETIRAcetam (KEPPRA) 500 mg tablet Take 1 Tab by mouth two (2) times a day. 60 Tab 3     No current facility-administered medications on file prior to visit. Reviewed PmHx, RxHx, FmHx, SocHx, AllgHx and updated and dated in the chart. Nurse notes were reviewed and are correct    Objective:     Vitals:    10/12/18 1205   BP: 102/59   Pulse: 78   Resp: 15   Temp: 98.1 °F (36.7 °C)   TempSrc: Oral   SpO2: 98%   Weight: 159 lb (72.1 kg)   Height: 5' 11\" (1.803 m)     Physical Exam   Constitutional: He is oriented to person, place, and time. He appears well-developed and well-nourished. No distress. HENT:   Head: Normocephalic and atraumatic. Right Ear: Hearing, tympanic membrane, external ear and ear canal normal.   Left Ear: Hearing, tympanic membrane, external ear and ear canal normal.   Nose: Mucosal edema and rhinorrhea present. No epistaxis. Mouth/Throat: Oropharynx is clear and moist.   Eyes: Conjunctivae and EOM are normal. Pupils are equal, round, and reactive to light. Neck: Normal range of motion. Neck supple. Cardiovascular: Normal rate, regular rhythm, normal heart sounds and intact distal pulses. Pulmonary/Chest: Effort normal and breath sounds normal.   Abdominal: Soft. Bowel sounds are normal.   Musculoskeletal: He exhibits no edema. Lymphadenopathy:     He has no cervical adenopathy. Neurological: He is alert and oriented to person, place, and time. Skin: Skin is warm and dry.  Capillary refill takes less than 2 seconds. Nursing note and vitals reviewed. Assessment/ Plan:     Diagnoses and all orders for this visit:    1. Preop examination        -     Based on history and exam patient is stable with regard to current medical conditions. Will obtain CBC and BMP as requested. Pt will follow up with neurology for evaluation prior to scheduled procedure. Pt and mother aware to be N. P.O 8 hours prior to procedure. -     CBC WITH AUTOMATED DIFF; Future  -     METABOLIC PANEL, BASIC; Future    2. Impacted teeth        -     Pt will follow up with Dr. Guillermo Daniel as scheduled for planned tooth extraction. 3. Seizure disorder ([facility]        -     Stable. Continue current medication regimen. Follow up with neurology as directed. 4. Allergic rhinitis, unspecified seasonality, unspecified trigger        -      Start Flonase          5. Developmental delay       I have discussed the diagnosis with the patient and the intended plan as seen in the above orders. The patient verbalized understanding and agrees with the plan. Follow-up Disposition:  Return in about 3 months (around 1/12/2019) for routine follow-up.     31 Lisset Babin MD

## 2018-10-31 ENCOUNTER — ANESTHESIA EVENT (OUTPATIENT)
Dept: SURGERY | Age: 23
End: 2018-10-31
Payer: MEDICAID

## 2018-10-31 NOTE — PERIOP NOTES
PAT - SURGICAL PRE-ADMISSION INSTRUCTIONS 
 
NAME:  Jeanette Liang                                                          TODAY'S DATE:  10/31/2018 SURGERY DATE:  11/1/2018                                  SURGERY ARRIVAL TIME:   1030 1. Do NOT eat or drink anything, including candy or gum, after MIDNIGHT on 10/31/18 , unless you have specific instructions from your Surgeon or Anesthesia Provider to do so. 2. No smoking on the day of surgery. 3. No alcohol 24 hours prior to the day of surgery. 4. No recreational drugs for one week prior to the day of surgery. 5. Leave all valuables, including money/purse, at home. 6. Remove all jewelry, nail polish, makeup (including mascara); no lotions, powders, deodorant, or perfume/cologne/after shave. 7. Glasses/Contact lenses and Dentures may be worn to the hospital.  They will be removed prior to surgery. 8. Call your doctor if symptoms of a cold or illness develop within 24 ours prior to surgery. 9. AN ADULT MUST DRIVE YOU HOME AFTER OUTPATIENT SURGERY. 10. If you are having an OUTPATIENT procedure, please make arrangements for a responsible adult to be with you for 24 hours after your surgery. 11. If you are admitted to the hospital, you will be assigned to a bed after surgery is complete. Normally a family member will not be able to see you until you are in your assigned bed. 15. Family is encouraged to accompany you to the hospital.  We ask visitors in the treatment area to be limited to ONE person at a time to ensure patient privacy. EXCEPTIONS WILL BE MADE AS NEEDED. 15. Children under 12 are discouraged from entering the treatment area and need to be supervised by an adult when in the waiting room. Special Instructions: Take these medications the morning of surgery with a sip of water:  MORNING MEDICINES Patient Prep: 
 
shower with anti-bacterial soap These surgical instructions were reviewed with PATIENT'S MOTHER during the PAT PHONE CALL. Directions: On the morning of surgery, please go to the [facility]. Enter the building from the [facility] entrance, 1st floor (next to the Emergency Room entrance). Take the elevator to the 2nd floor. Sign in at the Registration Desk. If you have any questions and/or concerns, please do not hesitate to call: 
(Prior to the day of surgery)  [facility] unit:  [phone] (Day of surgery)  [facility] unit:  [phone]

## 2018-11-01 ENCOUNTER — HOSPITAL ENCOUNTER (OUTPATIENT)
Age: 23
Setting detail: OUTPATIENT SURGERY
Discharge: HOME | End: 2018-11-01
Attending: DENTIST | Admitting: DENTIST
Payer: MEDICAID

## 2018-11-01 ENCOUNTER — ANESTHESIA (OUTPATIENT)
Dept: SURGERY | Age: 23
End: 2018-11-01
Payer: MEDICAID

## 2018-11-01 VITALS
HEART RATE: 91 BPM | TEMPERATURE: 97.6 F | DIASTOLIC BLOOD PRESSURE: 77 MMHG | RESPIRATION RATE: 16 BRPM | BODY MASS INDEX: 21.91 KG/M2 | WEIGHT: 156.5 LBS | OXYGEN SATURATION: 99 % | SYSTOLIC BLOOD PRESSURE: 121 MMHG | HEIGHT: 71 IN

## 2018-11-01 DIAGNOSIS — K01.1 DENTAL IMPACTION: Primary | ICD-10-CM

## 2018-11-01 PROCEDURE — 74011250636 HC RX REV CODE- 250/636: Performed by: NURSE ANESTHETIST, CERTIFIED REGISTERED

## 2018-11-01 PROCEDURE — 76010000149 HC OR TIME 1 TO 1.5 HR: Performed by: DENTIST

## 2018-11-01 PROCEDURE — 77030014006 HC SPNG HEMSTAT J&J -A: Performed by: DENTIST

## 2018-11-01 PROCEDURE — 77030002888 HC SUT CHRMC J&J -A: Performed by: DENTIST

## 2018-11-01 PROCEDURE — 74011250637 HC RX REV CODE- 250/637

## 2018-11-01 PROCEDURE — 74011250636 HC RX REV CODE- 250/636: Performed by: DENTIST

## 2018-11-01 PROCEDURE — 77030008683 HC TU ET CUF COVD -A

## 2018-11-01 PROCEDURE — 74011000250 HC RX REV CODE- 250

## 2018-11-01 PROCEDURE — 76210000020 HC REC RM PH II FIRST 0.5 HR: Performed by: DENTIST

## 2018-11-01 PROCEDURE — 77030032490 HC SLV COMPR SCD KNE COVD -B: Performed by: DENTIST

## 2018-11-01 PROCEDURE — 76210000017 HC OR PH I REC 1.5 TO 2 HR: Performed by: DENTIST

## 2018-11-01 PROCEDURE — 77030003388 HC CATH IV ANGIOCATH BD -A: Performed by: DENTIST

## 2018-11-01 PROCEDURE — 77030018836 HC SOL IRR NACL ICUM -A: Performed by: DENTIST

## 2018-11-01 PROCEDURE — 74011000258 HC RX REV CODE- 258: Performed by: DENTIST

## 2018-11-01 PROCEDURE — 74011250636 HC RX REV CODE- 250/636

## 2018-11-01 PROCEDURE — 77030013079 HC BLNKT BAIR HGGR 3M -A

## 2018-11-01 PROCEDURE — 74011250637 HC RX REV CODE- 250/637: Performed by: NURSE ANESTHETIST, CERTIFIED REGISTERED

## 2018-11-01 PROCEDURE — 74011000250 HC RX REV CODE- 250: Performed by: DENTIST

## 2018-11-01 PROCEDURE — 74011000272 HC RX REV CODE- 272: Performed by: DENTIST

## 2018-11-01 PROCEDURE — 76060000033 HC ANESTHESIA 1 TO 1.5 HR: Performed by: DENTIST

## 2018-11-01 RX ORDER — LIDOCAINE HYDROCHLORIDE 20 MG/ML
INJECTION, SOLUTION EPIDURAL; INFILTRATION; INTRACAUDAL; PERINEURAL AS NEEDED
Status: DISCONTINUED | OUTPATIENT
Start: 2018-11-01 | End: 2018-11-01 | Stop reason: HOSPADM

## 2018-11-01 RX ORDER — IBUPROFEN 200 MG
800 TABLET ORAL
Qty: 20 TAB | Refills: 0 | Status: ON HOLD | OUTPATIENT
Start: 2018-11-01 | End: 2019-06-04

## 2018-11-01 RX ORDER — MAGNESIUM SULFATE 100 %
4 CRYSTALS MISCELLANEOUS AS NEEDED
Status: CANCELLED | OUTPATIENT
Start: 2018-11-01

## 2018-11-01 RX ORDER — ONDANSETRON 2 MG/ML
4 INJECTION INTRAMUSCULAR; INTRAVENOUS
Status: DISCONTINUED | OUTPATIENT
Start: 2018-11-01 | End: 2018-11-01 | Stop reason: HOSPADM

## 2018-11-01 RX ORDER — SODIUM CHLORIDE, SODIUM LACTATE, POTASSIUM CHLORIDE, CALCIUM CHLORIDE 600; 310; 30; 20 MG/100ML; MG/100ML; MG/100ML; MG/100ML
50 INJECTION, SOLUTION INTRAVENOUS CONTINUOUS
Status: DISCONTINUED | OUTPATIENT
Start: 2018-11-01 | End: 2018-11-01 | Stop reason: HOSPADM

## 2018-11-01 RX ORDER — CHLORHEXIDINE GLUCONATE 1.2 MG/ML
15 RINSE ORAL 4 TIMES DAILY
Qty: 960 ML | Refills: 0 | Status: SHIPPED | OUTPATIENT
Start: 2018-11-01 | End: 2018-11-11

## 2018-11-01 RX ORDER — FAMOTIDINE 20 MG/1
20 TABLET, FILM COATED ORAL ONCE
Status: COMPLETED | OUTPATIENT
Start: 2018-11-01 | End: 2018-11-01

## 2018-11-01 RX ORDER — DEXAMETHASONE SODIUM PHOSPHATE 4 MG/ML
INJECTION, SOLUTION INTRA-ARTICULAR; INTRALESIONAL; INTRAMUSCULAR; INTRAVENOUS; SOFT TISSUE AS NEEDED
Status: DISCONTINUED | OUTPATIENT
Start: 2018-11-01 | End: 2018-11-01 | Stop reason: HOSPADM

## 2018-11-01 RX ORDER — AMOXICILLIN 500 MG/1
500 CAPSULE ORAL 3 TIMES DAILY
Qty: 21 CAP | Refills: 0 | Status: SHIPPED | OUTPATIENT
Start: 2018-11-01 | End: 2018-11-08

## 2018-11-01 RX ORDER — MIDAZOLAM HYDROCHLORIDE 1 MG/ML
INJECTION, SOLUTION INTRAMUSCULAR; INTRAVENOUS AS NEEDED
Status: DISCONTINUED | OUTPATIENT
Start: 2018-11-01 | End: 2018-11-01 | Stop reason: HOSPADM

## 2018-11-01 RX ORDER — SUCCINYLCHOLINE CHLORIDE 20 MG/ML
INJECTION INTRAMUSCULAR; INTRAVENOUS AS NEEDED
Status: DISCONTINUED | OUTPATIENT
Start: 2018-11-01 | End: 2018-11-01 | Stop reason: HOSPADM

## 2018-11-01 RX ORDER — FENTANYL CITRATE 50 UG/ML
50 INJECTION, SOLUTION INTRAMUSCULAR; INTRAVENOUS
Status: CANCELLED | OUTPATIENT
Start: 2018-11-01

## 2018-11-01 RX ORDER — OXYMETAZOLINE HCL 0.05 %
SPRAY, NON-AEROSOL (ML) NASAL AS NEEDED
Status: DISCONTINUED | OUTPATIENT
Start: 2018-11-01 | End: 2018-11-01 | Stop reason: HOSPADM

## 2018-11-01 RX ORDER — FENTANYL CITRATE 50 UG/ML
25 INJECTION, SOLUTION INTRAMUSCULAR; INTRAVENOUS AS NEEDED
Status: CANCELLED | OUTPATIENT
Start: 2018-11-02

## 2018-11-01 RX ORDER — ONDANSETRON 2 MG/ML
INJECTION INTRAMUSCULAR; INTRAVENOUS AS NEEDED
Status: DISCONTINUED | OUTPATIENT
Start: 2018-11-01 | End: 2018-11-01 | Stop reason: HOSPADM

## 2018-11-01 RX ORDER — DEXTROSE 50 % IN WATER (D50W) INTRAVENOUS SYRINGE
25-50 AS NEEDED
Status: CANCELLED | OUTPATIENT
Start: 2018-11-01

## 2018-11-01 RX ORDER — PROPOFOL 10 MG/ML
INJECTION, EMULSION INTRAVENOUS AS NEEDED
Status: DISCONTINUED | OUTPATIENT
Start: 2018-11-01 | End: 2018-11-01 | Stop reason: HOSPADM

## 2018-11-01 RX ORDER — LIDOCAINE HYDROCHLORIDE 20 MG/ML
JELLY TOPICAL AS NEEDED
Status: DISCONTINUED | OUTPATIENT
Start: 2018-11-01 | End: 2018-11-01 | Stop reason: HOSPADM

## 2018-11-01 RX ORDER — LIDOCAINE HYDROCHLORIDE AND EPINEPHRINE 5; 5 MG/ML; UG/ML
INJECTION, SOLUTION INFILTRATION; PERINEURAL AS NEEDED
Status: DISCONTINUED | OUTPATIENT
Start: 2018-11-01 | End: 2018-11-01 | Stop reason: HOSPADM

## 2018-11-01 RX ORDER — OXYCODONE AND ACETAMINOPHEN 5; 325 MG/1; MG/1
1 TABLET ORAL AS NEEDED
Status: CANCELLED | OUTPATIENT
Start: 2018-11-01

## 2018-11-01 RX ORDER — HYDROCODONE BITARTRATE AND ACETAMINOPHEN 7.5; 325 MG/1; MG/1
1 TABLET ORAL
Qty: 12 TAB | Refills: 0 | Status: SHIPPED | OUTPATIENT
Start: 2018-11-01 | End: 2018-11-04

## 2018-11-01 RX ORDER — LIDOCAINE HYDROCHLORIDE 10 MG/ML
0.1 INJECTION, SOLUTION EPIDURAL; INFILTRATION; INTRACAUDAL; PERINEURAL AS NEEDED
Status: DISCONTINUED | OUTPATIENT
Start: 2018-11-01 | End: 2018-11-01 | Stop reason: HOSPADM

## 2018-11-01 RX ORDER — SODIUM CHLORIDE, SODIUM LACTATE, POTASSIUM CHLORIDE, CALCIUM CHLORIDE 600; 310; 30; 20 MG/100ML; MG/100ML; MG/100ML; MG/100ML
25 INJECTION, SOLUTION INTRAVENOUS CONTINUOUS
Status: CANCELLED | OUTPATIENT
Start: 2018-11-01

## 2018-11-01 RX ORDER — FENTANYL CITRATE 50 UG/ML
INJECTION, SOLUTION INTRAMUSCULAR; INTRAVENOUS AS NEEDED
Status: DISCONTINUED | OUTPATIENT
Start: 2018-11-01 | End: 2018-11-01 | Stop reason: HOSPADM

## 2018-11-01 RX ORDER — ONDANSETRON 2 MG/ML
INJECTION INTRAMUSCULAR; INTRAVENOUS
Status: DISCONTINUED
Start: 2018-11-01 | End: 2018-11-01 | Stop reason: HOSPADM

## 2018-11-01 RX ADMIN — PROPOFOL 150 MG: 10 INJECTION, EMULSION INTRAVENOUS at 12:34

## 2018-11-01 RX ADMIN — FAMOTIDINE 20 MG: 20 TABLET ORAL at 11:51

## 2018-11-01 RX ADMIN — SODIUM CHLORIDE, SODIUM LACTATE, POTASSIUM CHLORIDE, AND CALCIUM CHLORIDE 50 ML/HR: 600; 310; 30; 20 INJECTION, SOLUTION INTRAVENOUS at 12:08

## 2018-11-01 RX ADMIN — SUCCINYLCHOLINE CHLORIDE 100 MG: 20 INJECTION INTRAMUSCULAR; INTRAVENOUS at 12:36

## 2018-11-01 RX ADMIN — ONDANSETRON 4 MG: 2 SOLUTION INTRAMUSCULAR; INTRAVENOUS at 14:40

## 2018-11-01 RX ADMIN — ONDANSETRON 4 MG: 2 INJECTION INTRAMUSCULAR; INTRAVENOUS at 14:40

## 2018-11-01 RX ADMIN — PROPOFOL 50 MG: 10 INJECTION, EMULSION INTRAVENOUS at 12:39

## 2018-11-01 RX ADMIN — ONDANSETRON 4 MG: 2 INJECTION INTRAMUSCULAR; INTRAVENOUS at 13:13

## 2018-11-01 RX ADMIN — MIDAZOLAM HYDROCHLORIDE 2 MG: 1 INJECTION, SOLUTION INTRAMUSCULAR; INTRAVENOUS at 12:23

## 2018-11-01 RX ADMIN — AMPICILLIN SODIUM 2 G: 2 INJECTION, POWDER, FOR SOLUTION INTRAMUSCULAR; INTRAVENOUS at 12:53

## 2018-11-01 RX ADMIN — FENTANYL CITRATE 100 MCG: 50 INJECTION, SOLUTION INTRAMUSCULAR; INTRAVENOUS at 12:29

## 2018-11-01 RX ADMIN — LIDOCAINE HYDROCHLORIDE 6 ML: 20 JELLY TOPICAL at 12:37

## 2018-11-01 RX ADMIN — LIDOCAINE HYDROCHLORIDE 60 MG: 20 INJECTION, SOLUTION EPIDURAL; INFILTRATION; INTRACAUDAL; PERINEURAL at 12:30

## 2018-11-01 RX ADMIN — Medication 2 SPRAY: at 12:23

## 2018-11-01 RX ADMIN — DEXAMETHASONE SODIUM PHOSPHATE 8 MG: 4 INJECTION, SOLUTION INTRA-ARTICULAR; INTRALESIONAL; INTRAMUSCULAR; INTRAVENOUS; SOFT TISSUE at 12:44

## 2018-11-01 NOTE — PERIOP NOTES
Recd care of pt from OR via stretcher. Resp even and unlabored. Attached to monitor. VSS. OR, MAR and anesthesia report acknowledged. Will cont to monitor. 1435  Pt woke up with nausea/vomiting noted. Marianelae removed. Dr. Magdiel Pruitt notified for Redd Sims. Awaiting order. Pt with no c/o pain or discomforts a this time. Will medicate as warranted and cont to monitor.

## 2018-11-01 NOTE — H&P
Date of Surgery Update: 
Mariana Timmons was seen and examined. History and physical has been reviewed. The patient has been examined. There have been no significant clinical changes since the completion of the originally dated History and Physical. 
 
Signed By: Ayesha Tay DDS  November 1, 2018 12:20 PM

## 2018-11-01 NOTE — ANESTHESIA PREPROCEDURE EVALUATION
Anesthetic History No history of anesthetic complications Review of Systems / Medical History Patient summary reviewed and pertinent labs reviewed Pulmonary Neuro/Psych  
 
seizures: poorly controlled Cardiovascular GI/Hepatic/Renal 
  
 
 
 
 
 
 Endo/Other Other Findings Physical Exam 
 
Airway Mallampati: II 
TM Distance: 4 - 6 cm Neck ROM: normal range of motion Mouth opening: Normal 
 
 Cardiovascular Rhythm: regular Rate: normal 
 
 
 
 Dental 
 
Dentition: Poor dentition Pulmonary Breath sounds clear to auscultation Abdominal 
GI exam deferred Other Findings Anesthetic Plan ASA: 2 Anesthesia type: general 
 
 
 
 
Induction: Intravenous Anesthetic plan and risks discussed with: Family and Mother

## 2018-11-01 NOTE — PERIOP NOTES
Phase 2 Recovery Summary Patient arrived to Phase 2 at 1511. Report received from Rich Davila RN VSS Patient denies pain or nausea at this time. Mom is at bedside. Vitals:  
 11/01/18 1441 11/01/18 1456 11/01/18 1507 11/01/18 1512 BP: 128/81 123/74  (P) 121/77 Pulse: 91 84 84 (P) 91 Resp: 17 15 15 (P) 16 Temp:      
SpO2: 98% 98% 98% (P) 99% Weight:      
Height:      
 
 
oriented to time, place, person and situation Lines and Drains Peripheral Intravenous Line:  
Peripheral IV 11/01/18 Left Hand (Active) Site Assessment Clean, dry, & intact 11/1/2018 12:06 PM  
Phlebitis Assessment 0 11/1/2018 12:06 PM  
Infiltration Assessment 0 11/1/2018 12:06 PM  
Dressing Status Clean, dry, & intact 11/1/2018 12:06 PM  
Dressing Type Tape;Transparent 11/1/2018 12:06 PM  
Hub Color/Line Status Infusing;Blue 11/1/2018 12:06 PM  
Alcohol Cap Used Yes 11/1/2018 12:06 PM  
 
 
Wound Wound Mouth (Active) DRESSING STATUS Moist 11/1/2018  1:15 PM  
DRESSING TYPE Sutures; Other (Comment) 11/1/2018  1:15 PM  
Incision site well approximated? Yes 11/1/2018  1:15 PM  
Number of days: 0 Patient discharged to [phone]. No questions or concerns at this time.   
 
Eleazar Tavares RN

## 2018-11-01 NOTE — ANESTHESIA POSTPROCEDURE EVALUATION
Procedure(s): 
extractions. Anesthesia Post Evaluation Multimodal analgesia: multimodal analgesia used between 6 hours prior to anesthesia start to PACU discharge Patient location during evaluation: bedside Patient participation: complete - patient participated Level of consciousness: awake Pain management: adequate Airway patency: patent Anesthetic complications: no 
Cardiovascular status: acceptable Respiratory status: acceptable Hydration status: acceptable Visit Vitals BP (P) 121/77 (BP 1 Location: Right arm, BP Patient Position: At rest) Pulse (P) 91 Temp 36.4 °C (97.5 °F) Resp (P) 16 Ht 5' 11\" (1.803 m) Wt 71 kg (156 lb 8 oz) SpO2 (P) 99% BMI 21.83 kg/m²

## 2018-11-01 NOTE — DISCHARGE INSTRUCTIONS
Dental Surgery: What to Expect at [address] [place] [address] surgery includes procedures such as tooth extractions, root canals, gum surgery, and dental implants. Your procedure may be done by:  · A dentist.  · An oral surgeon. · An endodontist, for root canals. · A periodontist, for gum surgery. You may have some pain, bleeding, or swelling afterward, depending on the procedure. You may get medicine for pain. The pain should improve steadily after the surgery. This care sheet gives you a general idea about how long it will take for you to recover. But each person recovers at a different pace. Follow the steps below to get better as quickly as possible. How can you care for yourself at home? Activity    · Allow the area to heal. Don't move quickly or lift anything heavy until you are feeling better.     · Rest when you feel tired.     · Your dentist may give you specific instructions on when you can do your normal activities again, such as driving and going back to work. Diet    · Eat soft foods, such as gelatin, pudding, or a thin soup. Gradually add solid foods to your diet as you heal. You can eat solid foods again in about a week.     · If you had a tooth pulled, don't use a straw for the first few days. Sucking on a straw can loosen the blood clot that forms at the surgery site. If this happens, it can delay healing. Medicines    · Your doctor will tell you if and when you can restart your medicines. He or she will also give you instructions about taking any new medicines.     · If you take blood thinners, such as warfarin (Coumadin), clopidogrel (Plavix), or aspirin, be sure to talk to your doctor. He or she will tell you if and when to start taking those medicines again. Make sure that you understand exactly what your doctor wants you to do.     · Be safe with medicines. Read and follow all instructions on the label.   ? If the dentist gave you a prescription medicine for pain, take it as prescribed. ? If you are not taking a prescription pain medicine, ask your dentist if you can take an over-the-counter medicine.     · If your dentist prescribed antibiotics, take them as directed. Do not stop taking them just because you feel better. You need to take the full course of antibiotics. Incision care    · While your mouth is numb, be careful not to bite your tongue or the inside of your cheek or lip.     · If you had a tooth pulled, bite gently on a gauze pad now and then. Change the pad as it becomes soaked with blood. Call your dentist or oral surgeon if you still have bleeding 24 hours after your surgery.     · If you had stitches in your gums, your dentist will tell you if and when you need to come back to have them removed.     · Starting 24 hours after your tooth was pulled, gently rinse your mouth with warm salt water several times a day to reduce swelling and relieve pain.     · Continue to brush your teeth and tongue carefully. Floss when your dentist says you can.    Ice and heat    · If needed, put ice or a cold pack on your cheek for 10 to 20 minutes at a time. Try to do this every 1 to 2 hours for the next 3 days (when you are awake) or until the swelling goes down. Put a thin cloth between the ice and your skin. Other instructions    · Do not smoke for at least 24 hours after your surgery. Smoking can delay healing. Smoking also decreases the blood supply and can bring germs and contaminants to the mouth. Follow-up care is a key part of your treatment and safety. Be sure to make and go to all appointments, and call your dentist if you are having problems. It's also a good idea to know your test results and keep a list of the medicines you take. When should you call for help? Call 911 anytime you think you may need emergency care.  For example, call if:    · You passed out (lost consciousness).     · You have severe trouble breathing.    Call your dentist now or seek immediate medical care if:    · You have pain that does not get better after you take pain medicine.     · You have loose stitches, or your incision comes open.     · You have new or more bleeding from the site.     · You have signs of infection, such as:  ? Increased pain, swelling, warmth, or redness. ? Red streaks leading from the area. ? Pus draining from the area. ? A fever.    Watch closely for changes in your health, and be sure to contact your dentist if you have any problems. Where can you learn more? Go to http://isac-tonny.info/. Enter Y931 in the search box to learn more about \"Dental Surgery: What to Expect at Home. \"  Current as of: March 28, 2018  Content Version: 11.8  © 1294-0796 [employer]. Care instructions adapted under license by [employer] (which disclaims liability or warranty for this information). If you have questions about a medical condition or this instruction, always ask your healthcare professional. [place] [address] any warranty or liability for your use of this information. DISCHARGE SUMMARY from Nurse    PATIENT INSTRUCTIONS:    After general anesthesia or intravenous sedation, for 24 hours or while taking prescription Narcotics:  · Limit your activities  · Do not drive and operate hazardous machinery  · Do not make important personal or business decisions  · Do  not drink alcoholic beverages  · If you have not urinated within 8 hours after discharge, please contact your surgeon on call.     Report the following to your surgeon:  · Excessive pain, swelling, redness or odor of or around the surgical area  · Temperature over 100.5  · Nausea and vomiting lasting longer than 4 hours or if unable to take medications  · Any signs of decreased circulation or nerve impairment to extremity: change in color, persistent  numbness, tingling, coldness or increase pain  · Any questions    What to do at Home:  Recommended activity: Activity as tolerated and no driving for today,     These are general instructions for a healthy lifestyle:    No smoking/ No tobacco products/ Avoid exposure to second hand smoke  Surgeon General's Warning:  Quitting smoking now greatly reduces serious risk to your health. Obesity, smoking, and sedentary lifestyle greatly increases your risk for illness    A healthy diet, regular physical exercise & weight monitoring are important for maintaining a healthy lifestyle    You may be retaining fluid if you have a history of heart failure or if you experience any of the following symptoms:  Weight gain of 3 pounds or more overnight or 5 pounds in a week, increased swelling in our hands or feet or shortness of breath while lying flat in bed. Please call your doctor as soon as you notice any of these symptoms; do not wait until your next office visit. Recognize signs and symptoms of STROKE:    F-face looks uneven    A-arms unable to move or move unevenly    S-speech slurred or non-existent    T-time-call 911 as soon as signs and symptoms begin-DO NOT go       Back to bed or wait to see if you get better-TIME IS BRAIN. Warning Signs of HEART ATTACK     Call 911 if you have these symptoms:   Chest discomfort. Most heart attacks involve discomfort in the center of the chest that lasts more than a few minutes, or that goes away and comes back. It can feel like uncomfortable pressure, squeezing, fullness, or pain.  Discomfort in other areas of the upper body. Symptoms can include pain or discomfort in one or both arms, the back, neck, jaw, or stomach.  Shortness of breath with or without chest discomfort.  Other signs may include breaking out in a cold sweat, nausea, or lightheadedness. Don't wait more than five minutes to call 911 - MINUTES MATTER! Fast action can save your life. Calling 911 is almost always the fastest way to get lifesaving treatment.  Emergency Medical Services staff can begin treatment when they arrive -- up to an hour sooner than if someone gets to the hospital by car. The discharge information has been reviewed with the patient. The patient verbalized understanding. Discharge medications reviewed with the patient and appropriate educational materials and side effects teaching were provided. ___________________________________________________________________________________________________________________________________  Patient armband removed and given to patient to take home.   Patient was informed of the privacy risks if armband lost or stolen

## 2018-11-02 NOTE — OP NOTES
[facility]  OPERATIVE REPORT    Arlene Irene  MR#: [id number]  : 1995  ACCOUNT #: [de-identified]   DATE OF SERVICE: 2018    SURGEON:  Rosamaria Chandler DDS. ASSISTANT:  See OR log for surgical assistant    PREOPERATIVE DIAGNOSIS: Dental impaction. POSTOPERATIVE DIAGNOSIS:  Dental impaction. PROCEDURE:  Surgical extraction of impacted teeth #1, 16, 17 and 32. INDICATIONS:  The patient is a 61-year-old male who presented to our office at [address] [place] [address],[address] Surgery for consultation for removal of 3rd molars. The patient was noted to have a history of grand mal seizures. Upon consultation with the patient's neurologist it was deemed necessary to bring the patient to the operating room in order to extract his remaining wisdom teeth. All risks, benefits and alternatives were discussed with the patient and the patient's mother,  informed consents were signed. DESCRIPTION OF PROCEDURE:  The patient was correctly identified in the preoperative holding area and taken to operating room #2. He was transferred from the stretcher to the OR table under his own volition with assistance from the [address] [place] [address] staff. The patient underwent induction and nasotracheal intubation without complication by the anesthesia staff. The eyes were taped and the endotracheal tube was secured by the Anesthesia team with my assistancef. A timeout was performed with all OR staff present. The patient was prepped and draped in normal fashion for oral surgery. Attention was directed to the oral cavity which was suctioned dry and a throat pack was placed. A total of 10 mL of 1% lidocaine with 1:100,000 epinephrine were injected. Attention was directed to the lower right quadrant and tooth #32. A crestal incision was made from the site of #32 to the mesial of #31 with a #15 blade with a distal buccal release.   A #9 periosteal elevator was used to elevate a full mucoperiosteal flap.  A #702 bur was then used to create a buccal trough and distal bone was also removed. The crown was sectioned with a #702 bur which was completed with the dental elevator. The remaining crown was removed with a hemostat. The roots were then elevated and removed with a hemostat. The bone was smoothed with a bone file and the superior portion of the extraction site was curetted. The site was irrigated with copious amounts of normal saline and then closed with 3-0 chromic gut suture in interrupted fashion. Attention was directed to the upper right quadrant and tooth  #1. A crestal incision was made from the distal of the tuberosity to the distal of tooth #2 with a distobuccal vertical release. A #9 periosteal elevator was used to elevate a full mucoperiosteal flap. Buccal and occlusal bone was removed from tooth #1 which was elevated and removed with #150 forceps. The dental follicle was removed with a hemostat. The bone was smoothed with a bone file. The site was curetted and irrigated with normal saline. The site was closed with 3-0 chromic gut suture in interrupted fashion. Attention was directed to the left side of the oral cavity and tooth #17. A sulcular incision was made from the site of #17 to the mesial of #18 with a distobuccal release using a #15 blade. A full mucoperiosteal flap was elevated with a #9 periosteal elevator. A buccal trough and distal bone was removed with a #702 bur. The crown was again sectioned with a #702 bur and completed with a dental elevator. The crown was removed with a hemostat. The remaining roots were elevated and removed with a hemostat. The site was curetted, the bone was smoothed with a bone file and irrigated with normal saline. The site was closed with 3-0 chromic gut suture in interrupted fashion. Attention was directed to the upper left quadrant.   A #15 blade was used to create a sulcular incision from the distal of #16 site to the distal of #15 with a distobuccal vertical release. A full mucoperiosteal flap was elevated with a #9 periosteal elevator. The overlying occlusal and buccal bone was removed from tooth #16 which was then elevated and removed with #150 forceps. The dental follicle was removed with a hemostat. The site was curetted, the bone was smoothed with a bone file. The site was irrigated with normal saline and closed with 3-0 chromic gut suture in interrupted fashion. The oral cavity was irrigated with normal saline and suctioned dry. The throat pack was suctioned and removed. Two 9 count gauze tied with umbilical tape were placed on either side of the oral cavity. The patient went through anesthesia induction and extubation without complication and was taken to the PACU in stable condition. ESTIMATED BLOOD LOSS:  20 mL. FLUIDS:  See the anesthesia record. IMPLANTS:  None. SPECIMENS:  None    DRAINS:  None. COUNTS:  Instrument count was correct x2 per the OR staff. COMPLICATIONS:  None.       DONNIE Ballard  D: 11/01/2018 14:04     T: 11/01/2018 23:22  JOB #: [id number]

## 2019-05-01 ENCOUNTER — OFFICE VISIT (OUTPATIENT)
Dept: VASCULAR SURGERY | Age: 24
End: 2019-05-01

## 2019-05-01 VITALS
HEIGHT: 71 IN | SYSTOLIC BLOOD PRESSURE: 114 MMHG | RESPIRATION RATE: 16 BRPM | BODY MASS INDEX: 21.84 KG/M2 | WEIGHT: 156 LBS | DIASTOLIC BLOOD PRESSURE: 66 MMHG

## 2019-05-01 DIAGNOSIS — G40.419 EPILEPSY, GENERALIZED TONIC-CLONIC, INTRACTABLE (HCC): Primary | ICD-10-CM

## 2019-05-01 DIAGNOSIS — R56.9 SEIZURES (HCC): ICD-10-CM

## 2019-05-01 DIAGNOSIS — G40.909 SEIZURE DISORDER (HCC): ICD-10-CM

## 2019-05-01 NOTE — LETTER
5/1/19 Patient: Bethany Yuan YOB: 1995 Date of Visit: 5/1/2019 Ephraim Leventhal, NP 
[address] [facility] [address] [place] [address] VIA Facsimile: [phone] Dear Ephraim Leventhal, NP, Thank you for referring Mr. Bethany Yuan to [facility] for evaluation. My notes for this consultation are attached. If you have questions, please do not hesitate to call me. I look forward to following your patient along with you. Sincerely, Freddie Myers MD

## 2019-05-01 NOTE — PROGRESS NOTES
[employer] Chief Complaint Patient presents with  New Patient VNS placement HPI [employer] is a 21 y.o. 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 Patient has been recommended for vagal nerve stimulator by his neurologist.  Most of his seizures seem to be absence seizure's. He has anywhere between 1-2 a month. Although there is some feeling that he may be having seizures at night that are going unrecognized. Currently no fevers or chills Past Medical History:  
Diagnosis Date  Convulsions, epileptic ([address]  Hallucination, visual   
 Learning difficulty  Refractory epilepsy ([facility] 2003 Patient Active Problem List  
Diagnosis Code  Hallucination, visual R44.1  Epilepsy, generalized tonic-clonic, intractable ([facility] G40.311  Seizures ([address] R56.9  Seizure disorder ([facility] G40.909 History reviewed. No pertinent surgical history. Current Outpatient Medications Medication Sig Dispense Refill  ibuprofen (MOTRIN) 200 mg tablet Take 4 Tabs by mouth every six (6) hours as needed for Pain. 20 Tab 0  
 lamoTRIgine (LAMICTAL) 150 mg tablet Take 2 Tabs by mouth two (2) times a day. (Patient taking differently: Take 150 mg by mouth two (2) times a day.) 120 Tab 0  carBAMazepine XR (TEGRETOL XR) 400 mg SR tablet Take 1 Tab by mouth two (2) times a day. 60 Tab 3  
 levETIRAcetam (KEPPRA) 500 mg tablet Take 1 Tab by mouth two (2) times a day. 60 Tab 3 No Known Allergies Social History Socioeconomic History  Marital status: SINGLE Spouse name: Not on file  Number of children: Not on file  Years of education: Not on file  Highest education level: Not on file Occupational History  Not on file Social Needs  Financial resource strain: Not on file  Food insecurity:  
  Worry: Not on file Inability: Not on file  Transportation needs:  
  Medical: Not on file Non-medical: Not on file Tobacco Use  
  Smoking status: Never Smoker  Smokeless tobacco: Never Used Substance and Sexual Activity  Alcohol use: No  
 Drug use: No  
 Sexual activity: Not Currently Lifestyle  Physical activity:  
  Days per week: Not on file Minutes per session: Not on file  Stress: Not on file Relationships  Social connections:  
  Talks on phone: Not on file Gets together: Not on file Attends Jain service: Not on file Active member of club or organization: Not on file Attends meetings of clubs or organizations: Not on file Relationship status: Not on file  Intimate partner violence:  
  Fear of current or ex partner: Not on file Emotionally abused: Not on file Physically abused: Not on file Forced sexual activity: Not on file Other Topics Concern  Not on file Social History Narrative  Not on file Family History Problem Relation Age of Onset  No Known Problems Mother  No Known Problems Sister  No Known Problems Brother  Cancer Maternal Uncle  Seizures Maternal Uncle  Seizures Paternal Uncle  Cancer Maternal Grandmother  Diabetes Maternal Grandfather  High Cholesterol Maternal Grandfather  Seizures Other Paternal Brittany Arenas, male  No Known Problems Sister  Cancer Other Review of Systems Constitutional: negative Eyes: negative Ears, nose, mouth, throat, and face: negative Respiratory: negative Cardiovascular: negative Gastrointestinal: negative Genitourinary:negative Hematologic/lymphatic: negative Musculoskeletal:negative Neurological: negative Behavioral/Psych: negative Endocrine: negative Allergic/Immunologic: negative Unless otherwise mentioned in the HPI. Physical Exam:   
Visit Vitals /66 (BP 1 Location: Left arm, BP Patient Position: Sitting) Resp 16 Ht 5' 11\" (1.803 m) Wt 156 lb (70.8 kg) BMI 21.76 kg/m² General: Well-appearing male in no acute distress HEENT: EOMI no scleral icterus is noted patient is wearing eyeglasses he has moist mucous membranes Pulmonary: No increased work of breathing is noted clear to auscultation bilaterally no wheezes rales or rhonchi 
Cardiovascular: Regular rate and rhythm normal S1-S2 no rubs murmurs or gallops no carotid bruits are heard bilaterally Abdomen: Soft nontender nondistended no rebound guarding is noted no palpable pulsatile abdominal mass can be felt Extremities: Warm and well-perfused bilaterally no edema is identified bilaterally no ulcerations are identified Neuro: Cranial nerves II through XII are grossly intact Impression and Plan: 
Estela Pacheco is a 21 y.o. male with idiopathic epilepsy. Patient is in need of vagal nerve stimulator and has been recommended for this by his neurologist.  Patient was given risks and benefits of the procedure including but not limited to bleeding, infection, damage to adjacent structures, MI, stroke, death, need for further surgery. Him and his mother are understanding all the risks and are willing to move forward with the surgery. We will get them on the schedule as soon as we can. We reviewed the plan with the patient and the patient understands. We also gave the patient appropriate instructions on their disease process and when to call back. Greater than 50% of this visit was spent with face to face discussion. Polina Moon MD 
 
PLEASE NOTE: 
This document has been produced using voice recognition software. Unrecognized errors in transcription may be present.

## 2019-05-29 ENCOUNTER — HOSPITAL ENCOUNTER (OUTPATIENT)
Dept: OTHER | Age: 24
Discharge: HOME | End: 2019-05-29
Payer: MEDICAID

## 2019-05-29 ENCOUNTER — HOSPITAL ENCOUNTER (OUTPATIENT)
Dept: LAB | Age: 24
Discharge: HOME | End: 2019-05-29
Payer: MEDICAID

## 2019-05-29 DIAGNOSIS — G40.909 SEIZURE DISORDER (HCC): ICD-10-CM

## 2019-05-29 DIAGNOSIS — G40.419 EPILEPSY, GENERALIZED TONIC-CLONIC, INTRACTABLE (HCC): ICD-10-CM

## 2019-05-29 DIAGNOSIS — R56.9 SEIZURES (HCC): ICD-10-CM

## 2019-05-29 LAB
ANION GAP SERPL CALC-SCNC: 7 MMOL/L (ref 3–18)
BUN SERPL-MCNC: 16 MG/DL (ref 7–18)
BUN/CREAT SERPL: 15 (ref 12–20)
CALCIUM SERPL-MCNC: 9.1 MG/DL (ref 8.5–10.1)
CHLORIDE SERPL-SCNC: 106 MMOL/L (ref 100–108)
CO2 SERPL-SCNC: 27 MMOL/L (ref 21–32)
CREAT SERPL-MCNC: 1.06 MG/DL (ref 0.6–1.3)
ERYTHROCYTE [DISTWIDTH] IN BLOOD BY AUTOMATED COUNT: 13.6 % (ref 11.6–14.5)
GLUCOSE SERPL-MCNC: 87 MG/DL (ref 74–99)
HCT VFR BLD AUTO: 42 % (ref 36–48)
HGB BLD-MCNC: 13.9 G/DL (ref 13–16)
MCH RBC QN AUTO: 29 PG (ref 24–34)
MCHC RBC AUTO-ENTMCNC: 33.1 G/DL (ref 31–37)
MCV RBC AUTO: 87.5 FL (ref 74–97)
PLATELET # BLD AUTO: 224 K/UL (ref 135–420)
PMV BLD AUTO: 10.3 FL (ref 9.2–11.8)
POTASSIUM SERPL-SCNC: 4.7 MMOL/L (ref 3.5–5.5)
RBC # BLD AUTO: 4.8 M/UL (ref 4.7–5.5)
SODIUM SERPL-SCNC: 140 MMOL/L (ref 136–145)
WBC # BLD AUTO: 3.8 K/UL (ref 4.6–13.2)

## 2019-05-29 PROCEDURE — 80048 BASIC METABOLIC PNL TOTAL CA: CPT

## 2019-05-29 PROCEDURE — 36415 COLL VENOUS BLD VENIPUNCTURE: CPT

## 2019-05-29 PROCEDURE — 71046 X-RAY EXAM CHEST 2 VIEWS: CPT

## 2019-05-29 PROCEDURE — 85027 COMPLETE CBC AUTOMATED: CPT

## 2019-06-03 ENCOUNTER — ANESTHESIA EVENT (OUTPATIENT)
Dept: CARDIOTHORACIC SURGERY | Age: 24
End: 2019-06-03
Payer: MEDICAID

## 2019-06-04 ENCOUNTER — HOSPITAL ENCOUNTER (OUTPATIENT)
Age: 24
Setting detail: OUTPATIENT SURGERY
Discharge: HOME | End: 2019-06-04
Attending: SURGERY | Admitting: SURGERY
Payer: MEDICAID

## 2019-06-04 ENCOUNTER — ANESTHESIA (OUTPATIENT)
Dept: CARDIOTHORACIC SURGERY | Age: 24
End: 2019-06-04
Payer: MEDICAID

## 2019-06-04 VITALS
SYSTOLIC BLOOD PRESSURE: 113 MMHG | WEIGHT: 145.13 LBS | HEIGHT: 71 IN | TEMPERATURE: 97.9 F | RESPIRATION RATE: 16 BRPM | HEART RATE: 76 BPM | BODY MASS INDEX: 20.32 KG/M2 | DIASTOLIC BLOOD PRESSURE: 68 MMHG | OXYGEN SATURATION: 98 %

## 2019-06-04 DIAGNOSIS — G40.419 EPILEPSY, GENERALIZED TONIC-CLONIC, INTRACTABLE (HCC): Primary | ICD-10-CM

## 2019-06-04 PROCEDURE — 77030032490 HC SLV COMPR SCD KNE COVD -B: Performed by: SURGERY

## 2019-06-04 PROCEDURE — 74011250636 HC RX REV CODE- 250/636: Performed by: NURSE ANESTHETIST, CERTIFIED REGISTERED

## 2019-06-04 PROCEDURE — 76060000034 HC ANESTHESIA 1.5 TO 2 HR: Performed by: SURGERY

## 2019-06-04 PROCEDURE — 74011250636 HC RX REV CODE- 250/636: Performed by: SURGERY

## 2019-06-04 PROCEDURE — 77030031139 HC SUT VCRL2 J&J -A: Performed by: SURGERY

## 2019-06-04 PROCEDURE — 77030002986 HC SUT PROL J&J -A: Performed by: SURGERY

## 2019-06-04 PROCEDURE — 77030002996 HC SUT SLK J&J -A: Performed by: SURGERY

## 2019-06-04 PROCEDURE — 77030010512 HC APPL CLP LIG J&J -C: Performed by: SURGERY

## 2019-06-04 PROCEDURE — 77030018836 HC SOL IRR NACL ICUM -A: Performed by: SURGERY

## 2019-06-04 PROCEDURE — 74011250637 HC RX REV CODE- 250/637: Performed by: NURSE ANESTHETIST, CERTIFIED REGISTERED

## 2019-06-04 PROCEDURE — 74011250636 HC RX REV CODE- 250/636

## 2019-06-04 PROCEDURE — 76010000129 HC CV SURG 1.5 TO 2 HR: Performed by: SURGERY

## 2019-06-04 PROCEDURE — C1778 LEAD, NEUROSTIMULATOR: HCPCS | Performed by: SURGERY

## 2019-06-04 PROCEDURE — C1767 GENERATOR, NEURO NON-RECHARG: HCPCS | Performed by: SURGERY

## 2019-06-04 PROCEDURE — 77030039266 HC ADH SKN EXOFIN S2SG -A: Performed by: SURGERY

## 2019-06-04 PROCEDURE — 77030013797 HC KT TRNSDUC PRSSR EDWD -A: Performed by: SURGERY

## 2019-06-04 PROCEDURE — 76210000021 HC REC RM PH II 0.5 TO 1 HR: Performed by: SURGERY

## 2019-06-04 PROCEDURE — 77030020270 HC MISC VASC IMPL: Performed by: SURGERY

## 2019-06-04 PROCEDURE — C1769 GUIDE WIRE: HCPCS | Performed by: SURGERY

## 2019-06-04 PROCEDURE — 77030020268 HC MISC GENERAL SUPPLY: Performed by: SURGERY

## 2019-06-04 PROCEDURE — 76210000006 HC OR PH I REC 0.5 TO 1 HR: Performed by: SURGERY

## 2019-06-04 RX ORDER — SODIUM CHLORIDE 0.9 % (FLUSH) 0.9 %
5-40 SYRINGE (ML) INJECTION EVERY 8 HOURS
Status: DISCONTINUED | OUTPATIENT
Start: 2019-06-04 | End: 2019-06-04 | Stop reason: HOSPADM

## 2019-06-04 RX ORDER — PROPOFOL 10 MG/ML
INJECTION, EMULSION INTRAVENOUS AS NEEDED
Status: DISCONTINUED | OUTPATIENT
Start: 2019-06-04 | End: 2019-06-04 | Stop reason: HOSPADM

## 2019-06-04 RX ORDER — ONDANSETRON 2 MG/ML
INJECTION INTRAMUSCULAR; INTRAVENOUS AS NEEDED
Status: DISCONTINUED | OUTPATIENT
Start: 2019-06-04 | End: 2019-06-04 | Stop reason: HOSPADM

## 2019-06-04 RX ORDER — MIDAZOLAM HYDROCHLORIDE 1 MG/ML
INJECTION, SOLUTION INTRAMUSCULAR; INTRAVENOUS AS NEEDED
Status: DISCONTINUED | OUTPATIENT
Start: 2019-06-04 | End: 2019-06-04 | Stop reason: HOSPADM

## 2019-06-04 RX ORDER — HYDROMORPHONE HYDROCHLORIDE 2 MG/ML
0.5 INJECTION, SOLUTION INTRAMUSCULAR; INTRAVENOUS; SUBCUTANEOUS
Status: DISCONTINUED | OUTPATIENT
Start: 2019-06-04 | End: 2019-06-04 | Stop reason: HOSPADM

## 2019-06-04 RX ORDER — LIDOCAINE HYDROCHLORIDE 10 MG/ML
0.1 INJECTION, SOLUTION EPIDURAL; INFILTRATION; INTRACAUDAL; PERINEURAL AS NEEDED
Status: DISCONTINUED | OUTPATIENT
Start: 2019-06-04 | End: 2019-06-04 | Stop reason: HOSPADM

## 2019-06-04 RX ORDER — LIDOCAINE HYDROCHLORIDE 10 MG/ML
INJECTION, SOLUTION EPIDURAL; INFILTRATION; INTRACAUDAL; PERINEURAL
Status: DISCONTINUED
Start: 2019-06-04 | End: 2019-06-04 | Stop reason: HOSPADM

## 2019-06-04 RX ORDER — SODIUM CHLORIDE, SODIUM LACTATE, POTASSIUM CHLORIDE, CALCIUM CHLORIDE 600; 310; 30; 20 MG/100ML; MG/100ML; MG/100ML; MG/100ML
75 INJECTION, SOLUTION INTRAVENOUS CONTINUOUS
Status: DISCONTINUED | OUTPATIENT
Start: 2019-06-04 | End: 2019-06-04 | Stop reason: HOSPADM

## 2019-06-04 RX ORDER — SODIUM CHLORIDE 0.9 % (FLUSH) 0.9 %
5-40 SYRINGE (ML) INJECTION AS NEEDED
Status: DISCONTINUED | OUTPATIENT
Start: 2019-06-04 | End: 2019-06-04 | Stop reason: HOSPADM

## 2019-06-04 RX ORDER — LIDOCAINE HYDROCHLORIDE 10 MG/ML
INJECTION, SOLUTION EPIDURAL; INFILTRATION; INTRACAUDAL; PERINEURAL AS NEEDED
Status: DISCONTINUED | OUTPATIENT
Start: 2019-06-04 | End: 2019-06-04 | Stop reason: HOSPADM

## 2019-06-04 RX ORDER — ONDANSETRON 2 MG/ML
4 INJECTION INTRAMUSCULAR; INTRAVENOUS ONCE
Status: DISCONTINUED | OUTPATIENT
Start: 2019-06-04 | End: 2019-06-04 | Stop reason: HOSPADM

## 2019-06-04 RX ORDER — HEPARIN SODIUM 200 [USP'U]/100ML
INJECTION, SOLUTION INTRAVENOUS
Status: DISCONTINUED
Start: 2019-06-04 | End: 2019-06-04 | Stop reason: WASHOUT

## 2019-06-04 RX ORDER — FAMOTIDINE 20 MG/1
20 TABLET, FILM COATED ORAL ONCE
Status: COMPLETED | OUTPATIENT
Start: 2019-06-04 | End: 2019-06-04

## 2019-06-04 RX ORDER — LIDOCAINE HYDROCHLORIDE 20 MG/ML
INJECTION, SOLUTION EPIDURAL; INFILTRATION; INTRACAUDAL; PERINEURAL AS NEEDED
Status: DISCONTINUED | OUTPATIENT
Start: 2019-06-04 | End: 2019-06-04 | Stop reason: HOSPADM

## 2019-06-04 RX ORDER — PROPOFOL 10 MG/ML
INJECTION, EMULSION INTRAVENOUS
Status: DISCONTINUED | OUTPATIENT
Start: 2019-06-04 | End: 2019-06-04 | Stop reason: HOSPADM

## 2019-06-04 RX ORDER — FENTANYL CITRATE 50 UG/ML
INJECTION, SOLUTION INTRAMUSCULAR; INTRAVENOUS AS NEEDED
Status: DISCONTINUED | OUTPATIENT
Start: 2019-06-04 | End: 2019-06-04 | Stop reason: HOSPADM

## 2019-06-04 RX ORDER — INSULIN LISPRO 100 [IU]/ML
INJECTION, SOLUTION INTRAVENOUS; SUBCUTANEOUS ONCE
Status: DISCONTINUED | OUTPATIENT
Start: 2019-06-04 | End: 2019-06-04 | Stop reason: HOSPADM

## 2019-06-04 RX ORDER — CEFAZOLIN SODIUM 2 G/50ML
2 SOLUTION INTRAVENOUS ONCE
Status: COMPLETED | OUTPATIENT
Start: 2019-06-04 | End: 2019-06-04

## 2019-06-04 RX ORDER — OXYCODONE AND ACETAMINOPHEN 5; 325 MG/1; MG/1
1 TABLET ORAL
Qty: 20 TAB | Refills: 0 | Status: SHIPPED | OUTPATIENT
Start: 2019-06-04 | End: 2019-06-11

## 2019-06-04 RX ADMIN — SODIUM CHLORIDE, SODIUM LACTATE, POTASSIUM CHLORIDE, AND CALCIUM CHLORIDE 75 ML/HR: 600; 310; 30; 20 INJECTION, SOLUTION INTRAVENOUS at 07:25

## 2019-06-04 RX ADMIN — PROPOFOL 50 MCG/KG/MIN: 10 INJECTION, EMULSION INTRAVENOUS at 08:13

## 2019-06-04 RX ADMIN — FENTANYL CITRATE 50 MCG: 50 INJECTION, SOLUTION INTRAMUSCULAR; INTRAVENOUS at 08:25

## 2019-06-04 RX ADMIN — ONDANSETRON 4 MG: 2 INJECTION INTRAMUSCULAR; INTRAVENOUS at 08:14

## 2019-06-04 RX ADMIN — FAMOTIDINE 20 MG: 20 TABLET, FILM COATED ORAL at 07:30

## 2019-06-04 RX ADMIN — LIDOCAINE HYDROCHLORIDE 20 MG: 20 INJECTION, SOLUTION EPIDURAL; INFILTRATION; INTRACAUDAL; PERINEURAL at 08:22

## 2019-06-04 RX ADMIN — FENTANYL CITRATE 50 MCG: 50 INJECTION, SOLUTION INTRAMUSCULAR; INTRAVENOUS at 08:14

## 2019-06-04 RX ADMIN — MIDAZOLAM HYDROCHLORIDE 2 MG: 1 INJECTION, SOLUTION INTRAMUSCULAR; INTRAVENOUS at 08:03

## 2019-06-04 RX ADMIN — PROPOFOL 50 MG: 10 INJECTION, EMULSION INTRAVENOUS at 08:13

## 2019-06-04 RX ADMIN — CEFAZOLIN 2 G: 10 INJECTION, POWDER, FOR SOLUTION INTRAVENOUS at 08:16

## 2019-06-04 RX ADMIN — LIDOCAINE HYDROCHLORIDE 20 MG: 20 INJECTION, SOLUTION EPIDURAL; INFILTRATION; INTRACAUDAL; PERINEURAL at 08:19

## 2019-06-04 RX ADMIN — LIDOCAINE HYDROCHLORIDE 20 MG: 20 INJECTION, SOLUTION EPIDURAL; INFILTRATION; INTRACAUDAL; PERINEURAL at 08:16

## 2019-06-04 RX ADMIN — PROPOFOL 50 MG: 10 INJECTION, EMULSION INTRAVENOUS at 08:19

## 2019-06-04 RX ADMIN — LIDOCAINE HYDROCHLORIDE 20 MG: 20 INJECTION, SOLUTION EPIDURAL; INFILTRATION; INTRACAUDAL; PERINEURAL at 08:26

## 2019-06-04 RX ADMIN — LIDOCAINE HYDROCHLORIDE 20 MG: 20 INJECTION, SOLUTION EPIDURAL; INFILTRATION; INTRACAUDAL; PERINEURAL at 08:13

## 2019-06-04 NOTE — ANESTHESIA PREPROCEDURE EVALUATION
Relevant Problems   No relevant active problems       Anesthetic History   No history of anesthetic complications            Review of Systems / Medical History  Patient summary reviewed and pertinent labs reviewed    Pulmonary  Within defined limits                 Neuro/Psych     seizures         Cardiovascular  Within defined limits                     GI/Hepatic/Renal  Within defined limits              Endo/Other  Within defined limits           Other Findings              Physical Exam    Airway  Mallampati: II  TM Distance: 4 - 6 cm  Neck ROM: normal range of motion   Mouth opening: Normal     Cardiovascular  Regular rate and rhythm,  S1 and S2 normal,  no murmur, click, rub, or gallop             Dental  No notable dental hx    Comments: One missing tooth   Pulmonary  Breath sounds clear to auscultation               Abdominal  GI exam deferred       Other Findings            Anesthetic Plan    ASA: 3  Anesthesia type: MAC and general - backup            Anesthetic plan and risks discussed with: Patient and Mother      Mother at bedside signed consent

## 2019-06-04 NOTE — OP NOTES
Preoperative diagnosis: epilepsy with seizure disorder requiring VNS placement    Postoperative diagnosis: epilepsy with seizure disorder requiring VNS placement    Procedures performed:  #1  placement of [employer] vagal nerve lead on left vagus nerve  #2  placement of [employer] vagal nerve stimulator battery pack/generator on left chest wall    Cultures: None    Specimens: None    Drains: None    Estimated blood loss: Less than 50 mL    Assistants: None    Implants: Please see above    Complications: none    Anesthesia: MAC with local     Indications for the procedure:  Lore Lomeli is a 21 y.o. male with epilepsy with seizure disorder requiring VNS placement. Patient was given the appropriate risk and benefits of the procedure including but not limited to bleeding, infection, damage to adjacent structures, MI, stroke, death, loss of lower extremity, need for further surgery. Patient was understanding of all the risks and underwent a procedure. Procedure:  Patient was correctly identified in the preoperative area and taken to the OR in stable condition. Patient had pre-incision timeout prior to any incision. Patient was prepped and draped in the normal sterile fashion according to CDC guidelines aseptic technique. Patient had appropriate preoperative antibiotics prior to any incision. We are able to identify the sternocleidomastoid at the base of the neck. Lidocaine was then given and a transverse incision was created in a natural fold of the neck. We dissected through the platysma and between the 2 heads of sternocleidomastoid. The internal jugular vein was identified and moved to laterally. The vagus nerve was identified and looped appropriately with blue vessel loop. This was skeletonized appropriately. Vagal nerve lead was then brought onto the field and all 3 leads were placed around the vagus nerve appropriately.   We then turned our attention to the chest wall and an area 2 fingerbreadths below the clavicle a transverse incision was created after appropriate lidocaine placement. We then were able to dissect out an appropriate size pocket for our battery generator pack. We used some lidocaine to numb our track area and took our tunneler from the chest into the neck and were then able to pull our lead down into the chest.  We then were able to connect onto the battery generator pack. We then placed the battery generator into the chest and appropriate diagnostic was performed with all portions in the green. We then were able to using the white connectors secure the lead within the neck onto the sternocleidomastoid were appropriate. We then were able to relocate the sternocleidomastoid together with 3-0 Vicryl sutures. We closed the platysma with 3-0 Vicryl sutures. And a 4-0 Vicryl subcuticular stitch was used to close the skin incision with Dermabond for dressing. We closed the pocket incision on the chest wall with 3-0 Vicryl deep dermal layer and 4-0 Vicryl subcuticular layer with Dermabond for dressing. Patient tolerated procedure well and was taken to the recovery area in stable condition. The device was left off to be turned on by the patient's neurology office.

## 2019-06-04 NOTE — ANESTHESIA POSTPROCEDURE EVALUATION
Procedure(s):  VAGUS NERVE STIMULATOR IMPLANTATION.     MAC    Anesthesia Post Evaluation      Multimodal analgesia: multimodal analgesia used between 6 hours prior to anesthesia start to PACU discharge  Patient location during evaluation: PACU  Patient participation: complete - patient participated  Level of consciousness: awake  Pain management: adequate  Airway patency: patent  Anesthetic complications: no  Cardiovascular status: acceptable  Respiratory status: acceptable  Hydration status: acceptable  Post anesthesia nausea and vomiting:  controlled      Vitals Value Taken Time   /68 6/4/2019 10:39 AM   Temp 36.6 °C (97.9 °F) 6/4/2019 10:39 AM   Pulse 76 6/4/2019 10:39 AM   Resp 16 6/4/2019 10:39 AM   SpO2 98 % 6/4/2019 10:39 AM

## 2019-06-04 NOTE — DISCHARGE INSTRUCTIONS
Epilepsy: Care Instructions  Your Care Instructions    Epilepsy is a common condition that causes repeated seizures. The seizures are caused by bursts of electrical activity in the brain that aren't normal. Seizures may cause problems with muscle control, movement, speech, vision, or awareness. They can be scary. Epilepsy affects each person differently. Some people have only a few seizures. Others get them more often. If you know what triggers a seizure, you may be able to avoid having one. You can take medicines to control and reduce seizures. You and your doctor will need to find the right combination, schedule, and dose of medicine. This may take time and careful changes. Seizures may get worse and happen more often over time. Follow-up care is a key part of your treatment and safety. Be sure to make and go to all appointments, and call your doctor if you are having problems. It's also a good idea to know your test results and keep a list of the medicines you take. How can you care for yourself at home? · Be safe with medicines. Take your medicines exactly as prescribed. Call your doctor if you think you are having a problem with your medicine. · Make a treatment plan with your doctor. Be sure to follow your plan. · Try to identify and avoid things that may make you more likely to have a seizure. These may include:  ? Not getting enough sleep. ? Using drugs or alcohol. ? Being emotionally stressed. ? Skipping meals. · Keep a record of any seizures you have. Note the date, time of day, and any details about the seizure that you can remember. Your doctor can use this information to plan or adjust your medicine or other treatment. · Be sure that any doctor treating you for another condition knows that you have epilepsy. Each doctor should know what medicines you are taking, if any. · Wear a medical ID bracelet. You can buy this at most [employer].  If you have a seizure that leaves you unconscious or unable to speak for yourself, this bracelet will let those who are treating you know that you have epilepsy. · Talk to your doctor about whether it is safe for you to do certain activities, such as drive or swim. When should you call for help? Call 911 anytime you think you may need emergency care. For example, call if:    · A seizure does not stop as it normally does.     · You have new symptoms such as:  ? Numbness, tingling, or weakness on one side of your body or face. ? Vision changes. ? Trouble speaking or thinking clearly.    Call your doctor now or seek immediate medical care if:    · You have a fever.     · You have a severe headache.    Watch closely for changes in your health, and be sure to contact your doctor if:    · The normal pattern or features of your seizures change. Where can you learn more? Go to http://isacY[place]tonny.info/. Alejandro Barraza in the search box to learn more about \"Epilepsy: Care Instructions. \"  Current as of: Iqra 3, 2018  Content Version: 11.9  © 0350-6942 [employer]. Care instructions adapted under license by [employer] (which disclaims liability or warranty for this information). If you have questions about a medical condition or this instruction, always ask your healthcare professional. [address] any warranty or liability for your use of this information. DISCHARGE SUMMARY from Nurse    PATIENT INSTRUCTIONS:    After general anesthesia or intravenous sedation, for 24 hours or while taking prescription Narcotics:  · Limit your activities  · Do not drive and operate hazardous machinery  · Do not make important personal or business decisions  · Do  not drink alcoholic beverages  · If you have not urinated within 8 hours after discharge, please contact your surgeon on call.     Report the following to your surgeon:  · Excessive pain, swelling, redness or odor of or around the surgical area  · Temperature over 100.5  · Nausea and vomiting lasting longer than 4 hours or if unable to take medications  · Any signs of decreased circulation or nerve impairment to extremity: change in color, persistent  numbness, tingling, coldness or increase pain  · Any questions    *  Please give a list of your current medications to your Primary Care Provider. *  Please update this list whenever your medications are discontinued, doses are      changed, or new medications (including over-the-counter products) are added. *  Please carry medication information at all times in case of emergency situations. These are general instructions for a healthy lifestyle:    No smoking/ No tobacco products/ Avoid exposure to second hand smoke  Surgeon General's Warning:  Quitting smoking now greatly reduces serious risk to your health. Obesity, smoking, and sedentary lifestyle greatly increases your risk for illness    A healthy diet, regular physical exercise & weight monitoring are important for maintaining a healthy lifestyle    You may be retaining fluid if you have a history of heart failure or if you experience any of the following symptoms:  Weight gain of 3 pounds or more overnight or 5 pounds in a week, increased swelling in our hands or feet or shortness of breath while lying flat in bed. Please call your doctor as soon as you notice any of these symptoms; do not wait until your next office visit. Recognize signs and symptoms of STROKE:    F-face looks uneven    A-arms unable to move or move unevenly    S-speech slurred or non-existent    T-time-call 911 as soon as signs and symptoms begin-DO NOT go       Back to bed or wait to see if you get better-TIME IS BRAIN. Warning Signs of HEART ATTACK     Call 911 if you have these symptoms:   Chest discomfort. Most heart attacks involve discomfort in the center of the chest that lasts more than a few minutes, or that goes away and comes back.  It can feel like uncomfortable pressure, squeezing, fullness, or pain.  Discomfort in other areas of the upper body. Symptoms can include pain or discomfort in one or both arms, the back, neck, jaw, or stomach.  Shortness of breath with or without chest discomfort.  Other signs may include breaking out in a cold sweat, nausea, or lightheadedness. Don't wait more than five minutes to call 911 - MINUTES MATTER! Fast action can save your life. Calling 911 is almost always the fastest way to get lifesaving treatment. Emergency Medical Services staff can begin treatment when they arrive -- up to an hour sooner than if someone gets to the hospital by car. The discharge information has been reviewed with the patient and parent. The patient and parent verbalized understanding. Discharge medications reviewed with the patient and appropriate educational materials and side effects teaching were provided.   ___________________________________________________________________________________________________________________________________

## 2019-06-04 NOTE — H&P
Surgery History and Physical    Subjective:      Jessica Etienne is a 21 y.o. male who presents with seizure disorder in need of vagal nerve stimulator. 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[address] 2003      Past Surgical History:   Procedure Laterality Date    HX WISDOM TEETH EXTRACTION        Social History     Tobacco Use    Smoking status: Never Smoker    Smokeless tobacco: Never Used   Substance Use Topics    Alcohol use: No      Family History   Problem Relation Age of Onset    No Known Problems Mother     No Known Problems Sister     No Known Problems Brother     Cancer Maternal Uncle     Seizures Maternal Uncle     Seizures Paternal Uncle     Cancer Maternal Grandmother     Diabetes Maternal Grandfather     High Cholesterol Maternal Grandfather     Seizures Other         Paternal Cousin, male    No Known Problems Sister     Cancer Other       Prior to Admission medications    Medication Sig Start Date End Date Taking? Authorizing Provider   OTHER    Yes Provider, Historical   lamoTRIgine (LAMICTAL) 150 mg tablet Take 2 Tabs by mouth two (2) times a day. Patient taking differently: Take 150 mg by mouth two (2) times a day. 7/7/17  Yes Chula Mcdowell MD   carBAMazepine XR (TEGRETOL XR) 400 mg SR tablet Take 1 Tab by mouth two (2) times a day. 4/6/16  Yes Anthony Blanton MD   levETIRAcetam (KEPPRA) 500 mg tablet Take 1 Tab by mouth two (2) times a day. 4/6/16  Yes Anthony Blanton MD   ibuprofen (MOTRIN) 200 mg tablet Take 4 Tabs by mouth every six (6) hours as needed for Pain.  11/1/18   Clemente Farrar DDS     No Known Allergies      ROS:  Pertinent items are noted in HPI. Unless otherwise mentioned in the HPI. Objective:     No data found. No data recorded. Physical Exam:  GENERAL: alert, cooperative, no distress, appears stated age, EYE: negative findings: anicteric sclera and EOMI, THROAT & NECK: normal, no erythema or exudates noted. and mucous membranes moist, LUNG: clear to auscultation bilaterally, HEART: regular rate and rhythm, S1, S2 normal, no murmur, click, rub or gallop, ABDOMEN: soft, non-tender. Bowel sounds normal. No masses,  no organomegaly, EXTREMITIES:  extremities normal, atraumatic, no cyanosis or edema    Labs: No results found for this or any previous visit (from the past 24 hour(s)). Data Review:    CBC:   Lab Results   Component Value Date/Time    WBC 3.8 (L) 05/29/2019 01:52 PM    RBC 4.80 05/29/2019 01:52 PM    HGB 13.9 05/29/2019 01:52 PM    HCT 42.0 05/29/2019 01:52 PM    PLATELET 128 25/97/7704 01:52 PM      BMP:   Lab Results   Component Value Date/Time    Glucose 87 05/29/2019 01:52 PM    Sodium 140 05/29/2019 01:52 PM    Potassium 4.7 05/29/2019 01:52 PM    Chloride 106 05/29/2019 01:52 PM    CO2 27 05/29/2019 01:52 PM    BUN 16 05/29/2019 01:52 PM    Creatinine 1.06 05/29/2019 01:52 PM    Calcium 9.1 05/29/2019 01:52 PM     Coagulation: No results found for: PTP, INR, APTT    Assessment:     Active Problems:    * No active hospital problems.  *      Plan:     Placement of vagal nerve stimulator    Signed By: Nathan Balbuena MD     June 4, 2019

## 2019-06-20 ENCOUNTER — OFFICE VISIT (OUTPATIENT)
Dept: VASCULAR SURGERY | Age: 24
End: 2019-06-20

## 2019-06-20 VITALS
DIASTOLIC BLOOD PRESSURE: 70 MMHG | WEIGHT: 145 LBS | BODY MASS INDEX: 20.3 KG/M2 | HEIGHT: 71 IN | SYSTOLIC BLOOD PRESSURE: 124 MMHG

## 2019-06-20 DIAGNOSIS — G40.909 SEIZURE DISORDER (HCC): ICD-10-CM

## 2019-06-20 DIAGNOSIS — G40.419 EPILEPSY, GENERALIZED TONIC-CLONIC, INTRACTABLE (HCC): Primary | ICD-10-CM

## 2019-06-20 DIAGNOSIS — R44.1 HALLUCINATION, VISUAL: ICD-10-CM

## 2019-06-20 NOTE — PROGRESS NOTES
Milana Pollack    Chief Complaint   Patient presents with    Surgical Follow-up       History and Physical    Milana Pollack is a 21 y.o. male with epilepsy with seizure disorder requiring VNS placement. He presents to the office today with his mother for his postoperative visit. He seems to be doing well overall but he does report some intermittent difficulty with swallowing. His mother also states he has had a voice change since the device was activated. She also states that the swallowing issue did not start until the device was activated which was last Friday. He did not have any postoperative concerns. He reported issues with eating or drinking. No shortness of breath or difficulties breathing. No fevers or chills. He does have an appointment scheduled with his neurologist tomorrow. Past Medical History:   Diagnosis Date    Convulsions[facility]ptic ([facility]     Hallucination, visual     Learning difficulty     Refractory epilepsy ([facility] [facility] 2003     Past Surgical History:   Procedure Laterality Date    HX WISDOM TEETH EXTRACTION       Patient Active Problem List   Diagnosis Code    Hallucination, visual R44.1    Epilepsy, generalized tonic-clonic, intractable [facility] G40.311    Seizures ([facility] [facility] R56.9    Seizure disorder ([facility]) G40.909     Current Outpatient Medications   Medication Sig Dispense Refill    OTHER       lamoTRIgine (LAMICTAL) 150 mg tablet Take 2 Tabs by mouth two (2) times a day. (Patient taking differently: Take 150 mg by mouth two (2) times a day.) 120 Tab 0    carBAMazepine XR (TEGRETOL XR) 400 mg SR tablet Take 1 Tab by mouth two (2) times a day. 60 Tab 3    levETIRAcetam (KEPPRA) 500 mg tablet Take 1 Tab by mouth two (2) times a day. 60 Tab 3     No Known Allergies    Physical Exam:    Visit Vitals  /70 (BP 1 Location: Left arm, BP Patient Position: Sitting)   Ht 5' 11\" (1.803 m)   Wt 145 lb (65.8 kg)   BMI 20.22 kg/m²      General: Well-appearing male in no acute distress. Patient with very flat affect and minimal interaction. He appears very comfortable in the office today. HEENT: EOMI, no scleral icterus is noted. Left neck incision c/d/i, mild erythema. No drainage. No signs of infection. Small hematoma noted at incision site. Trachea is midline. Left chest incision c/d/i. No signs of hematoma or infection. Pulmonary: No increased work or breathing is noted. Abdomen: nondistended. Extremities: Warm and well perfused bilaterally. Pt has no BLE edema. Neuro: Cranial nerves II through XII are grossly intact   Integument: No ulcerations are identified visibly      Impression and Plan:  Steven Bello is a 21 y.o. male with epilepsy with seizure disorder requiring VNS placement. He is doing well from a postoperative standpoint. He has been having some intermittent difficulties with swallowing as well as voice changes since activation of his device. He denies any shortness of breath or difficulty breathing. He seems to be eating and drinking well per his mother. He does have a follow-up appointment scheduled with his neurologist tomorrow. Discussed that I do not appreciate any urgent postoperative or vascular concerns. We will have him back in 1 week's time to reassess however. She is to call the office immediately with any new concerns or questions. Plan was discussed. Mother expresses understanding and agrees. Alicia Tellez  [phone]        PLEASE NOTE:  This document has been produced using voice recognition software. Unrecognized errors in transcription may be present.

## 2019-06-20 NOTE — PROGRESS NOTES
1. Have you been to an emergency room or urgent care clinic since your last visit?   no  Hospitalized since your last visit? If yes, where, when, and reason for visit?no   2. Have you seen or consulted any other health care providers outside of the [facility] since your last visit including any procedures, health maintenance items. If yes, where, when and reason for visit?

## 2019-06-20 NOTE — LETTER
6/20/19 Patient: Juan Francisco Vasquez YOB: 1995 Date of Visit: 6/20/2019 Ricarda Jack NP 
[address] [facility] [address] [address] VIA Facsimile: [phone] Dear Ricarda Jack NP, Thank you for referring Mr. Juan Francisco Vasquez to [address] [place] [address] [facility] for evaluation. My notes for this consultation are attached. If you have questions, please do not hesitate to call me. I look forward to following your patient along with you. Sincerely, [address] [place] [address]

## 2020-12-15 ENCOUNTER — HOSPITAL ENCOUNTER (EMERGENCY)
Age: 25
Discharge: HOME | End: 2020-12-15
Attending: EMERGENCY MEDICINE
Payer: COMMERCIAL

## 2020-12-15 VITALS
DIASTOLIC BLOOD PRESSURE: 75 MMHG | OXYGEN SATURATION: 98 % | TEMPERATURE: 98.9 F | RESPIRATION RATE: 16 BRPM | SYSTOLIC BLOOD PRESSURE: 120 MMHG | HEART RATE: 80 BPM

## 2020-12-15 DIAGNOSIS — S61.219A LACERATION OF FINGERS WITHOUT COMPLICATION, INITIAL ENCOUNTER: Primary | ICD-10-CM

## 2020-12-15 PROCEDURE — 90471 IMMUNIZATION ADMIN: CPT

## 2020-12-15 PROCEDURE — 99282 EMERGENCY DEPT VISIT SF MDM: CPT

## 2020-12-15 PROCEDURE — 90715 TDAP VACCINE 7 YRS/> IM: CPT | Performed by: EMERGENCY MEDICINE

## 2020-12-15 PROCEDURE — 75810000293 HC SIMP/SUPERF WND  RPR

## 2020-12-15 PROCEDURE — 2709999900 HC NON-CHARGEABLE SUPPLY

## 2020-12-15 PROCEDURE — 74011250636 HC RX REV CODE- 250/636: Performed by: EMERGENCY MEDICINE

## 2020-12-15 RX ORDER — SERTRALINE HYDROCHLORIDE 100 MG/1
100 TABLET, FILM COATED ORAL DAILY
COMMUNITY

## 2020-12-15 RX ADMIN — TETANUS TOXOID, REDUCED DIPHTHERIA TOXOID AND ACELLULAR PERTUSSIS VACCINE, ADSORBED 0.5 ML: 5; 2.5; 8; 8; 2.5 SUSPENSION INTRAMUSCULAR at 04:21

## 2020-12-15 NOTE — ED PROVIDER NOTES
EMERGENCY DEPARTMENT HISTORY AND PHYSICAL EXAM    4:06 AM      Date: 12/15/2020  Patient Name: Stacia Olmstead    History of Presenting Illness     Chief Complaint   Patient presents with    Laceration         History Provided By: patient    Additional History (Context): Stacia Olmstead is a 22 y.o. male presents with just prior to arrival, patient was trying to cook and suffered laceration to the palmar surface of his left hand fingers distal phalanx third and fourth digits. Tetanus shot is greater than 11years old. No contributory history. Michell Hendrix PCP: Nurys Gao NP    Chief Complaint:   Duration:    Timing:    Location:   Quality:   Severity:   Modifying Factors:   Associated Symptoms:       Current Facility-Administered Medications   Medication Dose Route Frequency Provider Last Rate Last Dose    diph,Pertuss(AC),Tet Vac-PF (BOOSTRIX) suspension 0.5 mL  0.5 mL IntraMUSCular PRIOR TO DISCHARGE Link Foster MD         Current Outpatient Medications   Medication Sig Dispense Refill    sertraline (Zoloft) 100 mg tablet Take 100 mg by mouth daily.  lamoTRIgine (LAMICTAL) 150 mg tablet Take 2 Tabs by mouth two (2) times a day. (Patient taking differently: Take 150 mg by mouth two (2) times a day.) 120 Tab 0    carBAMazepine XR (TEGRETOL XR) 400 mg SR tablet Take 1 Tab by mouth two (2) times a day. 60 Tab 3    levETIRAcetam (KEPPRA) 500 mg tablet Take 1 Tab by mouth two (2) times a day.  61 Tab 3    OTHER          Past History     Past Medical History:  Past Medical History:   Diagnosis Date    Convulsions, epileptic ([address]     Hallucination, visual     Learning difficulty     Refractory epilepsy ([address] 2003       Past Surgical History:  Past Surgical History:   Procedure Laterality Date    HX WISDOM TEETH EXTRACTION         Family History:  Family History   Problem Relation Age of Onset    No Known Problems Mother     No Known Problems Sister     No Known Problems Brother     Cancer Maternal Uncle     Seizures Maternal Uncle     Seizures Paternal Uncle     Cancer Maternal Grandmother     Diabetes Maternal Grandfather     High Cholesterol Maternal Grandfather     Seizures Other         Paternal Cousin, male    No Known Problems Sister     Cancer Other        Social History:  Social History     Tobacco Use    Smoking status: Never Smoker    Smokeless tobacco: Never Used   Substance Use Topics    Alcohol use: No    Drug use: No       Allergies:  No Known Allergies      Review of Systems     Review of Systems   Constitutional: Negative for diaphoresis and fever. HENT: Negative for congestion and sore throat. Eyes: Negative for pain and itching. Respiratory: Negative for cough and shortness of breath. Cardiovascular: Negative for chest pain and palpitations. Gastrointestinal: Negative for abdominal pain and diarrhea. Endocrine: Negative for polydipsia and polyuria. Genitourinary: Negative for dysuria and hematuria. Musculoskeletal: Negative for arthralgias and myalgias. Skin: Positive for wound. Negative for rash. Neurological: Negative for seizures and syncope. Hematological: Does not bruise/bleed easily. Psychiatric/Behavioral: Negative for agitation and hallucinations. Physical Exam       Patient Vitals for the past 12 hrs:   Temp Pulse Resp BP SpO2   12/15/20 0313 98.9 °F (37.2 °C) 80 16 120/75 98 %       Physical Exam  Vitals signs and nursing note reviewed. Constitutional:       General: He is not in acute distress. Appearance: Normal appearance. He is well-developed. He is not ill-appearing. HENT:      Head: Normocephalic and atraumatic. Eyes:      General: No scleral icterus. Conjunctiva/sclera: Conjunctivae normal.   Neck:      Musculoskeletal: Normal range of motion and neck supple. Vascular: No JVD. Cardiovascular:      Rate and Rhythm: Normal rate and regular rhythm.    Pulmonary:      Effort: Pulmonary effort is normal. No respiratory distress. Musculoskeletal: Normal range of motion. Comments: Left hand third and fourth fingers have lacerations, about a centimeter and a half each on the distal phalanx, through the pads on the palmar surface. Slow bleeding oozes present   Skin:     General: Skin is warm and dry. Neurological:      Mental Status: He is alert. Psychiatric:         Thought Content: Thought content normal.         Judgment: Judgment normal.             Diagnostic Study Results   Labs -  No results found for this or any previous visit (from the past 12 hour(s)). Radiologic Studies -   No orders to display     No results found. Medications ordered:   Medications   diph,Pertuss(AC),Tet Vac-PF (BOOSTRIX) suspension 0.5 mL (has no administration in time range)         Medical Decision Making   Initial Medical Decision Making and DDx:  Relatively clean environment clean kitchen knife, no debris in the wounds, cleaned at home. Update tetanus, no antibiotics, watch for signs of infection, sutures out in 2 weeks, [address] for follow-up. ED Course: Progress Notes, Reevaluation, and Consults:     Wound Repair    Date/Time: 12/15/2020 4:08 AM  Performed by: attendingPreparation: skin prepped with Betadine  Location: Left third digit laceration. Wound length:2.5 cm or less  Anesthesia: local infiltration and digital block    Anesthesia:  Local Anesthetic: lidocaine 1% without epinephrine  Anesthetic total: 2 mL  Foreign bodies: no foreign bodies  Irrigation solution: saline  Irrigation method: syringe  Skin closure: 4-0 nylon  Number of sutures: 4  Technique: interrupted  Approximation: loose  Patient tolerance: Patient tolerated the procedure well with no immediate complications  My total time at bedside, performing this procedure was 1-15 minutes. Wound Repair    Date/Time: 12/15/2020 4:09 AM  Performed by: attendingPreparation: skin prepped with Betadine  Location: Left fourth digit.   Wound length:2.5 cm or less  Anesthesia: local infiltration and digital block    Anesthesia:  Local Anesthetic: lidocaine 1% without epinephrine  Foreign bodies: no foreign bodies  Irrigation solution: saline  Irrigation method: syringe  Skin closure: 4-0 nylon  Number of sutures: 3  Technique: horizontal mattress  Patient tolerance: Patient tolerated the procedure well with no immediate complications  My total time at bedside, performing this procedure was 1-15 minutes. I am the first provider for this patient. I reviewed the vital signs, available nursing notes, past medical history, past surgical history, family history and social history. Patient Vitals for the past 12 hrs:   Temp Pulse Resp BP SpO2   12/15/20 0313 98.9 °F (37.2 °C) 80 16 120/75 98 %       Vital Signs-Reviewed the patient's vital signs. Pulse Oximetry Analysis, Cardiac Monitor, 12 lead ekg:      Interpreted by the EP. Records Reviewed: Nursing notes reviewed (Time of Review: 4:06 AM)    Procedures:   Critical Care Time:   Aspirin: (was aspirin given for stroke?)    Diagnosis     Clinical Impression:   1. Laceration of fingers without complication, initial encounter        Disposition: Discharged      Follow-up Information     Follow up With Specialties Details Why Contact Info    Bita Alvarado, Ike Bhardwaj MD Orthopedic Surgery, Orthopedic Surgery, Orthopedic Surgery   [address]  [phone]             Patient's Medications   Start Taking    No medications on file   Continue Taking    CARBAMAZEPINE XR (TEGRETOL XR) 400 MG SR TABLET    Take 1 Tab by mouth two (2) times a day. LAMOTRIGINE (LAMICTAL) 150 MG TABLET    Take 2 Tabs by mouth two (2) times a day. LEVETIRACETAM (KEPPRA) 500 MG TABLET    Take 1 Tab by mouth two (2) times a day. OTHER        SERTRALINE (ZOLOFT) 100 MG TABLET    Take 100 mg by mouth daily.    These Medications have changed    No medications on file   Stop Taking    No medications on file     _______________________________    Notes:    Vineet Fire, MD using Dragon dictation      _______________________________

## 2020-12-15 NOTE — ED TRIAGE NOTES
Patient cut left 3rd and 4th finger pads with a knife while cooking. Dressing from home removed and lacerations still actively bleeding. Pressure bandage applied.

## 2020-12-15 NOTE — ED NOTES
Bulky dressing applied to fingers. Patient tolerated well. I have reviewed discharge and follow up instructions with the patient and mother. Verbalized understanding recieved.  Patient left unit ambulatory in no apparent distress for discharge to home

## 2023-11-03 NOTE — ED PROVIDER NOTES
[facility] EMERGENCY DEPT      24 y.o. male with noted past medical history of Hallucination, and Seizures who presents to the emergency department via EMS for 1 seizure onset 1 hour ago. Per mother noted the pt has a h/o seizures that have not been as significant as this one stating that the pt convulsed more then usual. Per mother noted the pt sees Dr. Fay Hernandez for his seizures. All other sx denied. No other complaints at this time. Hx is limited due to pt seizing upon evaluation. No other complaints. No current facility-administered medications for this encounter. Current Outpatient Prescriptions   Medication Sig    carBAMazepine XR (TEGRETOL XR) 400 mg SR tablet Take 1 Tab by mouth two (2) times a day.  levETIRAcetam (KEPPRA) 500 mg tablet Take 1 Tab by mouth two (2) times a day.  lamoTRIgine (LAMICTAL) 100 mg tablet Take 1 Tab by mouth two (2) times a day.  lamoTRIgine (LAMICTAL) 50 mg disintegrating tablet Take 1 Tab by mouth two (2) times a day. 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 No pertinent surgical history. Family History   Problem Relation Age of Onset    No Known Problems Mother     No Known Problems Sister     No Known Problems Brother     Cancer Maternal Uncle     Seizures Maternal Uncle     Seizures Paternal Uncle     Cancer Maternal Grandmother     Diabetes Maternal Grandfather     High Cholesterol Maternal Grandfather     Seizures Other      Paternal Cousin, male    No Known Problems Sister     Cancer Other        Social History     Social History    Marital status: SINGLE     Spouse name: N/A    Number of children: N/A    Years of education: N/A     Occupational History    Not on file.      Social History Main Topics    Smoking status: Never Smoker    Smokeless tobacco: Never Used    Alcohol use No Please have patient seen by dietician at rehab and should be given high protein supplement     Drug use: No    Sexual activity: Not Currently     Other Topics Concern    Not on file     Social History Narrative       No Known Allergies    Patient's primary care provider (as noted in EPIC):  Khushboo Kuo MD    REVIEW OF SYSTEMS:    Constitutional:  Negative for diaphoresis. HENT:  Negative for congestion. Respiratory:  Negative for cough and shortness of breath. Cardiovascular:  Negative for chest pain and palpitations. Gastrointestinal:  Negative for diarrhea. Genitourinary:  Negative for flank pain. Musculoskeletal:  Negative for back pain. Skin:  Negative for pallor. Neurological:  Negative for focal numbness, weakness or tingling. Negative for slurred speech. Positive for seizure. Visit Vitals    /68    Pulse (!) 116    Temp 98 °F (36.7 °C)    Resp 25    Wt 79.2 kg (174 lb 9.6 oz)    SpO2 97%    BMI 24.35 kg/m2       PHYSICAL EXAM:    CONSTITUTIONAL:  Alert, in no apparent distress;  well developed;  well nourished. HEAD:  Normocephalic, atraumatic. EYES:  EOMI. Non-icteric sclera. Normal conjunctiva. ENTM:  Nose:  no rhinorrhea. Throat:  no erythema or exudate, mucous membranes moist.  NECK:  No JVD. Supple  RESPIRATORY:  Chest clear, equal breath sounds, good air movement. CARDIOVASCULAR:  Regular rate and rhythm. No murmurs, rubs, or gallops. GI:  Normal bowel sounds, abdomen soft and non-tender. No rebound or guarding. BACK:  Non-tender. UPPER EXT:  Normal inspection. LOWER EXT:  No edema, no calf tenderness. Distal pulses intact. NEURO:  Moves all four extremities. Normal motor exam and sensation in all four extremities. Normal CN II-XII exam.  Normal bilateral finger-to-nose exam.     SKIN:  No rashes;  Normal for age. PSYCH:  Alert and normal affect.     DIFFERENTIAL DIAGNOSES/ MEDICAL DECISION MAKING:   Breakthrough seizures in epileptic, electrolyte abnormality/ies, medical noncomplaince, head trauma, intracranial hemorrhage, seizures in epileptic from alcohol use, infection, and/or decreased sleep lowering threshold, sepsis, other etiologies versus combination of the above. ED COURSE:      Abnormal lab results from this emergency department encounter:  Labs Reviewed   METABOLIC PANEL, BASIC - Abnormal; Notable for the following:        Result Value    Glucose 115 (*)     All other components within normal limits   MAGNESIUM - Abnormal; Notable for the following:     Magnesium 2.8 (*)     All other components within normal limits   CBC WITH AUTOMATED DIFF   CARBAMAZEPINE   LEVETIRACETAM (KEPPRA)       Lab values for this patient within approximately the last 12 hours:  Recent Results (from the past 12 hour(s))   METABOLIC PANEL, BASIC    Collection Time: 07/03/17  4:50 AM   Result Value Ref Range    Sodium 139 136 - 145 mmol/L    Potassium 3.5 3.5 - 5.5 mmol/L    Chloride 104 100 - 108 mmol/L    CO2 23 21 - 32 mmol/L    Anion gap 12 3.0 - 18 mmol/L    Glucose 115 (H) 74 - 99 mg/dL    BUN 14 7.0 - 18 MG/DL    Creatinine 1.08 0.6 - 1.3 MG/DL    BUN/Creatinine ratio 13 12 - 20      GFR est AA >60 >60 ml/min/1.73m2    GFR est non-AA >60 >60 ml/min/1.73m2    Calcium 9.0 8.5 - 10.1 MG/DL   CBC WITH AUTOMATED DIFF    Collection Time: 07/03/17  4:50 AM   Result Value Ref Range    WBC 9.2 4.6 - 13.2 K/uL    RBC 5.18 4.70 - 5.50 M/uL    HGB 14.7 13.0 - 16.0 g/dL    HCT 43.2 36.0 - 48.0 %    MCV 83.4 74.0 - 97.0 FL    MCH 28.4 24.0 - 34.0 PG    MCHC 34.0 31.0 - 37.0 g/dL    RDW 13.8 11.6 - 14.5 %    PLATELET 928 612 - 467 K/uL    MPV 10.2 9.2 - 11.8 FL    NEUTROPHILS 54 40 - 73 %    LYMPHOCYTES 36 21 - 52 %    MONOCYTES 8 3 - 10 %    EOSINOPHILS 2 0 - 5 %    BASOPHILS 0 0 - 2 %    ABS. NEUTROPHILS 5.0 1.8 - 8.0 K/UL    ABS. LYMPHOCYTES 3.3 0.9 - 3.6 K/UL    ABS. MONOCYTES 0.7 0.05 - 1.2 K/UL    ABS. EOSINOPHILS 0.2 0.0 - 0.4 K/UL    ABS.  BASOPHILS 0.0 0.0 - 0.06 K/UL    DF AUTOMATED     MAGNESIUM    Collection Time: 07/03/17  4:50 AM   Result Value Ref Range    Magnesium 2.8 (H) 1.6 - 2.6 mg/dL       Radiologist and cardiologist interpretations if available at time of this note:  No results found. Medication(s) ordered for patient during this emergency visit encounter:  Medications   LORazepam (ATIVAN) injection 1 mg (1 mg IntraVENous Given 7/3/17 0516)       Based on the patient's history of presenting illness, physical examination, laboratory, radiographic, and/or tests results, and response to medical interventions, I believe the patient most likely is having breakthrough seizures in a known epileptic. Diagnoses:  1. Breakthrough seizure(s) in a known epileptic. SPECIFIC PATIENT INSTRUCTIONS FROM THE PHYSICIAN WHO TREATED YOU IN THE ER TODAY:  1. Return if worse. 2. You must continue to take your seizure medication(s) regularly to minimize breakthrough seizures. 3. Follow up with your doctor or neurologist within the next 2-3 days. Signout Note      Pt care transferred to Dr. Darrick Cockayne  ,ED provider. History of patient complaint(s), available diagnostic reports and current treatment plan has been discussed thoroughly. Bedside rounding on patient occured : no . Intended disposition of patient : Home  Pending diagnostics reports and/or labs (please list): Shiva Dials Carbamazepine level and reevaluation of patient pending at time of signout. If these are normal, to consider above discharge diagnoses and instructions. Jessica Alexander M.D. Provider Attestation:  If a scribe was utilized in generation of this patient record, I personally performed the services described in the documentation, reviewed the documentation, as recorded by the scribe in my presence, and it accurately records the patient's history of presenting illness, review of systems, patient physical examination, and procedures performed by me as the attending physician. Jessica Alexander M.D.   AB Board Certified Emergency Physician  7/3/2017.  5:19 AM    Scribe [address] scribing for and in the presence of Kin Garcia MD 5:19 AM, 07/03/17. Physician Attestation  I personally performed the services described in the documentation, reviewed the documentation, as recorded by the scribe in my presence, and it accurately and completely records my words and actions.     Kin Garcia MD 5:19 AM 07/03/17    Signed by : Mady Wells, 07/03/17 at 5:19 AM

## (undated) DEVICE — MEDI-VAC SUCTION HANDLE REGULAR CAPACITY: Brand: CARDINAL HEALTH

## (undated) DEVICE — 3M(TM) TEGADERM(TM) TRANSPARENT FILM DRESSING FRAME STYLE 9505W: Brand: 3M™ TEGADERM™

## (undated) DEVICE — GUIDEWIRE FIX COR S STL STR 021INX15CM

## (undated) DEVICE — 12FR FRAZIER SUCTION HANDLE: Brand: CARDINAL HEALTH

## (undated) DEVICE — KENDALL SCD EXPRESS SLEEVES, KNEE LENGTH, MEDIUM: Brand: KENDALL SCD

## (undated) DEVICE — SUTURE COAT VCRL SZ 4-0 L18IN ABSRB UD L19MM PS-2 1/2 CIR J496G

## (undated) DEVICE — Z DISCONTINUED USE 2425483 (LOW STOCK PER MEDLINE) TAPE UMB L18IN DIA1/8IN WHT COT NONABSORBABLE W/O NDL FOR

## (undated) DEVICE — INTENDED FOR TISSUE SEPARATION, AND OTHER PROCEDURES THAT REQUIRE A SHARP SURGICAL BLADE TO PUNCTURE OR CUT.: Brand: BARD-PARKER ® CARBON RIB-BACK BLADES

## (undated) DEVICE — SPONGE DISSECT PNUT SM 3/8IN -- 5/PK

## (undated) DEVICE — CATHETER IV 10GA L3IN OD3.3-3.5MM ID2.64-2.74MM BRN FEP

## (undated) DEVICE — THIS PRODUCT IS SINGLE USE AND INTENDED TO BE USED FOR BLUNT DISSECTION OF TISSUE.: Brand: ASPEN® ENDOSCOPIC KITTNER, SINGLE TIP

## (undated) DEVICE — TOWEL,OR,DSP,ST,WHITE,DLX,4/PK,20PK/CS: Brand: MEDLINE

## (undated) DEVICE — SYR 10ML CTRL LR LCK NSAF LF --

## (undated) DEVICE — SUTURE CHROMIC GUT SZ 3-0 L27IN ABSRB BRN L17MM RB-1 1/2 U204H

## (undated) DEVICE — INTENDED USED TO PROTECT, TAG AND HELP LOCATED SUTURES DURING SURGERY: Brand: STERION®SUTURE AID BOOTIES

## (undated) DEVICE — 8FR FRAZIER SUCTION HANDLE: Brand: CARDINAL HEALTH

## (undated) DEVICE — LIMB HOLDER, WRIST/ANKLE: Brand: DEROYAL

## (undated) DEVICE — PACKING GZ W2INXL6FT WVN COT VAG RADPQ

## (undated) DEVICE — TAPE UMB 1/8X30IN MP COT WHT --

## (undated) DEVICE — SOLUTION IV 1000ML 0.9% SOD CHL

## (undated) DEVICE — NEEDLE HYPO 25GA L1.5IN BLU POLYPR HUB S STL REG BVL STR

## (undated) DEVICE — GLOVE SURG BIOGEL 8.0 STRL -- SKINSENSE

## (undated) DEVICE — DEPAUL MINOR HEAD AND NECK: Brand: MEDLINE INDUSTRIES, INC.

## (undated) DEVICE — INFUSOR PRSS BG CUF 500ML -- MEDICHOICE

## (undated) DEVICE — PRESSURE MONITORING SET: Brand: TRUWAVE

## (undated) DEVICE — KIT CLN UP BON SECOURS MARYV

## (undated) DEVICE — SOLIDIFIER MEDC 15000ML -- MEDICHOICE

## (undated) DEVICE — Device

## (undated) DEVICE — TUNNELER: Brand: CYBERONICS® TUNNELER MODEL 402

## (undated) DEVICE — DRAPE: MAGNETIC 12X16 30/CS: Brand: MEDICAL ACTION INDUSTRIES

## (undated) DEVICE — SUTURE PERMAHAND SZ 2-0 L30IN NONABSORBABLE BLK SILK W/O A305H

## (undated) DEVICE — GAUZE SPONGES,12 PLY: Brand: CURITY

## (undated) DEVICE — APPLICATOR BNDG 1MM ADH PREMIERPRO EXOFIN

## (undated) DEVICE — SUTURE VCRL SZ 3-0 L27IN ABSRB UD L26MM SH 1/2 CIR J416H

## (undated) DEVICE — NEEDLE HYPO 25GA L1.5IN BVL ORIENTED ECLIPSE

## (undated) DEVICE — SYRINGE MED 20ML STD CLR PLAS LUERLOCK TIP N CTRL DISP

## (undated) DEVICE — APPLIER CLP L9.375IN APER 2.1MM CLS L3.8MM 20 SM TI CLP

## (undated) DEVICE — CANISTER SUCT 1500ML PLAS DISS INLET AND HYDROPHOBIC FLTR

## (undated) DEVICE — SUT PROL 2-0 30IN SH BLU --

## (undated) DEVICE — GLOVE SURG SZ 7.5 L11.73IN FNGR THK9.8MIL STRW LTX POLYMER

## (undated) DEVICE — SUPPORT WRST AD W3.5XL9IN DIA14.5IN ART SFT ADJ HK AND LOOP

## (undated) DEVICE — ARGYLE FRAZIER SURGICAL SUCTION INSTRUMENT 12 FR/CH (4.0 MM): Brand: ARGYLE

## (undated) DEVICE — SURGIFOAM SPNG SZ 12-7

## (undated) DEVICE — STERILE POLYISOPRENE POWDER-FREE SURGICAL GLOVES: Brand: PROTEXIS

## (undated) DEVICE — SPONGE GZ W4XL4IN COT 12 PLY TYP VII WVN C FLD DSGN

## (undated) DEVICE — REM POLYHESIVE ADULT PATIENT RETURN ELECTRODE: Brand: VALLEYLAB

## (undated) DEVICE — TRAY PREP DRY W/ PREM GLV 2 APPL 6 SPNG 2 UNDPD 1 OVERWRAP

## (undated) DEVICE — INTENDED FOR TISSUE SEPARATION, AND OTHER PROCEDURES THAT REQUIRE A SHARP SURGICAL BLADE TO PUNCTURE OR CUT.: Brand: BARD-PARKER ® STAINLESS STEEL BLADES

## (undated) DEVICE — 48" PROBE COVER W/GEL, ULTRASOUND, STERILE: Brand: SITE-RITE